# Patient Record
Sex: FEMALE | Race: WHITE | URBAN - NONMETROPOLITAN AREA
[De-identification: names, ages, dates, MRNs, and addresses within clinical notes are randomized per-mention and may not be internally consistent; named-entity substitution may affect disease eponyms.]

---

## 2019-01-05 ENCOUNTER — HOSPITAL ENCOUNTER (INPATIENT)
Age: 64
LOS: 32 days | Discharge: NURSING, REHAB OR LONG-TERM CARE | DRG: 885 | End: 2019-02-06
Attending: PSYCHIATRY & NEUROLOGY | Admitting: PSYCHIATRY & NEUROLOGY
Payer: MEDICARE

## 2019-01-05 PROBLEM — F31.13: Status: ACTIVE | Noted: 2019-01-05

## 2019-01-05 LAB — ALCOHOL SCREEN SERUM: <0.01 %WT/VOL

## 2019-01-05 PROCEDURE — 6370000000 HC RX 637 (ALT 250 FOR IP): Performed by: PSYCHIATRY & NEUROLOGY

## 2019-01-05 PROCEDURE — 6370000000 HC RX 637 (ALT 250 FOR IP): Performed by: HOSPITALIST

## 2019-01-05 PROCEDURE — 36415 COLL VENOUS BLD VENIPUNCTURE: CPT

## 2019-01-05 PROCEDURE — 1240000000 HC EMOTIONAL WELLNESS R&B

## 2019-01-05 PROCEDURE — G0480 DRUG TEST DEF 1-7 CLASSES: HCPCS

## 2019-01-05 RX ORDER — DIVALPROEX SODIUM 500 MG/1
1000 TABLET, EXTENDED RELEASE ORAL NIGHTLY
Status: DISCONTINUED | OUTPATIENT
Start: 2019-01-05 | End: 2019-01-07

## 2019-01-05 RX ORDER — TRAZODONE HYDROCHLORIDE 50 MG/1
50 TABLET ORAL NIGHTLY PRN
Status: DISCONTINUED | OUTPATIENT
Start: 2019-01-05 | End: 2019-01-07

## 2019-01-05 RX ORDER — DIVALPROEX SODIUM 500 MG/1
1000 TABLET, EXTENDED RELEASE ORAL DAILY
Status: DISCONTINUED | OUTPATIENT
Start: 2019-01-05 | End: 2019-01-05

## 2019-01-05 RX ORDER — PALIPERIDONE 3 MG/1
6 TABLET, EXTENDED RELEASE ORAL NIGHTLY
Status: DISCONTINUED | OUTPATIENT
Start: 2019-01-05 | End: 2019-01-08

## 2019-01-05 RX ORDER — MAGNESIUM HYDROXIDE/ALUMINUM HYDROXICE/SIMETHICONE 120; 1200; 1200 MG/30ML; MG/30ML; MG/30ML
30 SUSPENSION ORAL EVERY 6 HOURS PRN
Status: DISCONTINUED | OUTPATIENT
Start: 2019-01-05 | End: 2019-02-06 | Stop reason: HOSPADM

## 2019-01-05 RX ORDER — LANOLIN ALCOHOL/MO/W.PET/CERES
100 CREAM (GRAM) TOPICAL DAILY
COMMUNITY

## 2019-01-05 RX ORDER — CETIRIZINE HYDROCHLORIDE 10 MG/1
10 TABLET ORAL DAILY
Status: DISCONTINUED | OUTPATIENT
Start: 2019-01-05 | End: 2019-02-06 | Stop reason: HOSPADM

## 2019-01-05 RX ORDER — FAMOTIDINE 20 MG/1
20 TABLET, FILM COATED ORAL 2 TIMES DAILY
Status: DISCONTINUED | OUTPATIENT
Start: 2019-01-05 | End: 2019-02-06 | Stop reason: HOSPADM

## 2019-01-05 RX ORDER — LORAZEPAM 1 MG/1
1 TABLET ORAL EVERY 4 HOURS PRN
Status: DISCONTINUED | OUTPATIENT
Start: 2019-01-05 | End: 2019-02-06 | Stop reason: HOSPADM

## 2019-01-05 RX ORDER — ACETAMINOPHEN 325 MG/1
650 TABLET ORAL EVERY 4 HOURS PRN
Status: DISCONTINUED | OUTPATIENT
Start: 2019-01-05 | End: 2019-02-06 | Stop reason: HOSPADM

## 2019-01-05 RX ORDER — BENZONATATE 100 MG/1
100 CAPSULE ORAL 3 TIMES DAILY PRN
Status: DISCONTINUED | OUTPATIENT
Start: 2019-01-05 | End: 2019-02-06 | Stop reason: HOSPADM

## 2019-01-05 RX ORDER — RANITIDINE 150 MG/1
150 TABLET ORAL 2 TIMES DAILY
COMMUNITY

## 2019-01-05 RX ORDER — RISPERIDONE 2 MG/1
2 TABLET, FILM COATED ORAL NIGHTLY
Status: DISCONTINUED | OUTPATIENT
Start: 2019-01-05 | End: 2019-01-05

## 2019-01-05 RX ADMIN — ACETAMINOPHEN 650 MG: 325 TABLET ORAL at 19:38

## 2019-01-05 RX ADMIN — PALIPERIDONE 6 MG: 3 TABLET, EXTENDED RELEASE ORAL at 20:09

## 2019-01-05 RX ADMIN — FAMOTIDINE 20 MG: 20 TABLET ORAL at 20:09

## 2019-01-05 RX ADMIN — BENZONATATE 100 MG: 100 CAPSULE ORAL at 23:23

## 2019-01-05 RX ADMIN — ACETAMINOPHEN 650 MG: 325 TABLET ORAL at 12:11

## 2019-01-05 RX ADMIN — CETIRIZINE HYDROCHLORIDE 10 MG: 10 TABLET, FILM COATED ORAL at 12:11

## 2019-01-05 RX ADMIN — BENZOCAINE AND MENTHOL 1 LOZENGE: 15; 3.6 LOZENGE ORAL at 22:52

## 2019-01-05 RX ADMIN — DIVALPROEX SODIUM 1000 MG: 500 TABLET, EXTENDED RELEASE ORAL at 20:09

## 2019-01-05 RX ADMIN — BENZOCAINE AND MENTHOL 1 LOZENGE: 15; 3.6 LOZENGE ORAL at 20:11

## 2019-01-05 RX ADMIN — BENZOCAINE AND MENTHOL 1 LOZENGE: 15; 3.6 LOZENGE ORAL at 15:46

## 2019-01-05 RX ADMIN — BENZONATATE 100 MG: 100 CAPSULE ORAL at 12:11

## 2019-01-05 ASSESSMENT — SLEEP AND FATIGUE QUESTIONNAIRES
DIFFICULTY STAYING ASLEEP: YES
DIFFICULTY FALLING ASLEEP: YES
DO YOU USE A SLEEP AID: NO
AVERAGE NUMBER OF SLEEP HOURS: 1
SLEEP PATTERN: DIFFICULTY FALLING ASLEEP;INSOMNIA
DIFFICULTY ARISING: NO
DO YOU HAVE DIFFICULTY SLEEPING: YES
RESTFUL SLEEP: NO

## 2019-01-05 ASSESSMENT — PATIENT HEALTH QUESTIONNAIRE - PHQ9: SUM OF ALL RESPONSES TO PHQ QUESTIONS 1-9: 8

## 2019-01-05 ASSESSMENT — PAIN SCALES - GENERAL: PAINLEVEL_OUTOF10: 3

## 2019-01-06 LAB
ANION GAP SERPL CALCULATED.3IONS-SCNC: 11 MMOL/L (ref 4–16)
BASOPHILS ABSOLUTE: 0.1 K/CU MM
BASOPHILS RELATIVE PERCENT: 0.4 % (ref 0–1)
BUN BLDV-MCNC: 9 MG/DL (ref 6–23)
CALCIUM SERPL-MCNC: 8.8 MG/DL (ref 8.3–10.6)
CHLORIDE BLD-SCNC: 101 MMOL/L (ref 99–110)
CO2: 28 MMOL/L (ref 21–32)
CREAT SERPL-MCNC: 0.7 MG/DL (ref 0.6–1.1)
DIFFERENTIAL TYPE: ABNORMAL
EOSINOPHILS ABSOLUTE: 0.2 K/CU MM
EOSINOPHILS RELATIVE PERCENT: 1.5 % (ref 0–3)
ESTIMATED AVERAGE GLUCOSE: 157 MG/DL
GFR AFRICAN AMERICAN: >60 ML/MIN/1.73M2
GFR NON-AFRICAN AMERICAN: >60 ML/MIN/1.73M2
GLUCOSE BLD-MCNC: 161 MG/DL (ref 70–99)
HBA1C MFR BLD: 7.1 % (ref 4.2–6.3)
HCT VFR BLD CALC: 42.6 % (ref 37–47)
HEMOGLOBIN: 13.3 GM/DL (ref 12.5–16)
IMMATURE NEUTROPHIL %: 0.5 % (ref 0–0.43)
LYMPHOCYTES ABSOLUTE: 3.1 K/CU MM
LYMPHOCYTES RELATIVE PERCENT: 23.7 % (ref 24–44)
MCH RBC QN AUTO: 30.1 PG (ref 27–31)
MCHC RBC AUTO-ENTMCNC: 31.2 % (ref 32–36)
MCV RBC AUTO: 96.4 FL (ref 78–100)
MONOCYTES ABSOLUTE: 1 K/CU MM
MONOCYTES RELATIVE PERCENT: 7.5 % (ref 0–4)
PDW BLD-RTO: 12.5 % (ref 11.7–14.9)
PLATELET # BLD: 271 K/CU MM (ref 140–440)
PMV BLD AUTO: 11.7 FL (ref 7.5–11.1)
POTASSIUM SERPL-SCNC: 4.2 MMOL/L (ref 3.5–5.1)
RBC # BLD: 4.42 M/CU MM (ref 4.2–5.4)
SEGMENTED NEUTROPHILS ABSOLUTE COUNT: 8.7 K/CU MM
SEGMENTED NEUTROPHILS RELATIVE PERCENT: 66.4 % (ref 36–66)
SODIUM BLD-SCNC: 140 MMOL/L (ref 135–145)
TOTAL IMMATURE NEUTOROPHIL: 0.07 K/CU MM
WBC # BLD: 13.2 K/CU MM (ref 4–10.5)

## 2019-01-06 PROCEDURE — 80048 BASIC METABOLIC PNL TOTAL CA: CPT

## 2019-01-06 PROCEDURE — 1240000000 HC EMOTIONAL WELLNESS R&B

## 2019-01-06 PROCEDURE — 6360000002 HC RX W HCPCS: Performed by: PSYCHIATRY & NEUROLOGY

## 2019-01-06 PROCEDURE — 6370000000 HC RX 637 (ALT 250 FOR IP): Performed by: PSYCHIATRY & NEUROLOGY

## 2019-01-06 PROCEDURE — 83036 HEMOGLOBIN GLYCOSYLATED A1C: CPT

## 2019-01-06 PROCEDURE — 36415 COLL VENOUS BLD VENIPUNCTURE: CPT

## 2019-01-06 PROCEDURE — 6370000000 HC RX 637 (ALT 250 FOR IP): Performed by: HOSPITALIST

## 2019-01-06 PROCEDURE — 85025 COMPLETE CBC W/AUTO DIFF WBC: CPT

## 2019-01-06 RX ADMIN — DIVALPROEX SODIUM 1000 MG: 500 TABLET, EXTENDED RELEASE ORAL at 20:47

## 2019-01-06 RX ADMIN — CETIRIZINE HYDROCHLORIDE 10 MG: 10 TABLET, FILM COATED ORAL at 08:36

## 2019-01-06 RX ADMIN — FAMOTIDINE 20 MG: 20 TABLET ORAL at 20:47

## 2019-01-06 RX ADMIN — ACETAMINOPHEN 650 MG: 325 TABLET ORAL at 10:50

## 2019-01-06 RX ADMIN — ACETAMINOPHEN 650 MG: 325 TABLET ORAL at 03:15

## 2019-01-06 RX ADMIN — BENZOCAINE AND MENTHOL 1 LOZENGE: 15; 3.6 LOZENGE ORAL at 15:44

## 2019-01-06 RX ADMIN — ACETAMINOPHEN 650 MG: 325 TABLET ORAL at 15:44

## 2019-01-06 RX ADMIN — PALIPERIDONE 6 MG: 3 TABLET, EXTENDED RELEASE ORAL at 20:47

## 2019-01-06 RX ADMIN — FAMOTIDINE 20 MG: 20 TABLET ORAL at 08:36

## 2019-01-06 RX ADMIN — BENZONATATE 100 MG: 100 CAPSULE ORAL at 15:44

## 2019-01-06 RX ADMIN — PALIPERIDONE PALMITATE 234 MG: 234 INJECTION INTRAMUSCULAR at 08:35

## 2019-01-06 RX ADMIN — BENZONATATE 100 MG: 100 CAPSULE ORAL at 03:15

## 2019-01-06 RX ADMIN — BENZONATATE 100 MG: 100 CAPSULE ORAL at 08:36

## 2019-01-06 ASSESSMENT — PAIN SCALES - GENERAL
PAINLEVEL_OUTOF10: 7
PAINLEVEL_OUTOF10: 3
PAINLEVEL_OUTOF10: 0

## 2019-01-07 PROCEDURE — 1240000000 HC EMOTIONAL WELLNESS R&B

## 2019-01-07 PROCEDURE — 6370000000 HC RX 637 (ALT 250 FOR IP): Performed by: PSYCHIATRY & NEUROLOGY

## 2019-01-07 PROCEDURE — 6370000000 HC RX 637 (ALT 250 FOR IP): Performed by: HOSPITALIST

## 2019-01-07 PROCEDURE — 6370000000 HC RX 637 (ALT 250 FOR IP): Performed by: NURSE PRACTITIONER

## 2019-01-07 RX ORDER — DIVALPROEX SODIUM 500 MG/1
2000 TABLET, EXTENDED RELEASE ORAL NIGHTLY
Status: DISCONTINUED | OUTPATIENT
Start: 2019-01-07 | End: 2019-01-08

## 2019-01-07 RX ORDER — TRAZODONE HYDROCHLORIDE 100 MG/1
100 TABLET ORAL NIGHTLY PRN
Status: DISCONTINUED | OUTPATIENT
Start: 2019-01-07 | End: 2019-01-08

## 2019-01-07 RX ADMIN — CETIRIZINE HYDROCHLORIDE 10 MG: 10 TABLET, FILM COATED ORAL at 08:25

## 2019-01-07 RX ADMIN — DIVALPROEX SODIUM 2000 MG: 500 TABLET, EXTENDED RELEASE ORAL at 20:46

## 2019-01-07 RX ADMIN — PALIPERIDONE 6 MG: 3 TABLET, EXTENDED RELEASE ORAL at 20:46

## 2019-01-07 RX ADMIN — ACETAMINOPHEN 650 MG: 325 TABLET ORAL at 11:03

## 2019-01-07 RX ADMIN — ALUMINUM HYDROXIDE, MAGNESIUM HYDROXIDE, AND SIMETHICONE 30 ML: 200; 200; 20 SUSPENSION ORAL at 18:30

## 2019-01-07 RX ADMIN — BENZOCAINE AND MENTHOL 1 LOZENGE: 15; 3.6 LOZENGE ORAL at 00:11

## 2019-01-07 RX ADMIN — FAMOTIDINE 20 MG: 20 TABLET ORAL at 08:25

## 2019-01-07 RX ADMIN — ACETAMINOPHEN 650 MG: 325 TABLET ORAL at 00:11

## 2019-01-07 RX ADMIN — FAMOTIDINE 20 MG: 20 TABLET ORAL at 20:46

## 2019-01-07 RX ADMIN — BENZOCAINE AND MENTHOL 1 LOZENGE: 15; 3.6 LOZENGE ORAL at 04:39

## 2019-01-07 ASSESSMENT — PAIN SCALES - GENERAL
PAINLEVEL_OUTOF10: 3
PAINLEVEL_OUTOF10: 3
PAINLEVEL_OUTOF10: 8
PAINLEVEL_OUTOF10: 3
PAINLEVEL_OUTOF10: 0

## 2019-01-08 LAB — VALPROIC ACID LEVEL: 66.2 UG/ML (ref 50–100)

## 2019-01-08 PROCEDURE — 1240000000 HC EMOTIONAL WELLNESS R&B

## 2019-01-08 PROCEDURE — 36415 COLL VENOUS BLD VENIPUNCTURE: CPT

## 2019-01-08 PROCEDURE — 80164 ASSAY DIPROPYLACETIC ACD TOT: CPT

## 2019-01-08 PROCEDURE — 6370000000 HC RX 637 (ALT 250 FOR IP): Performed by: HOSPITALIST

## 2019-01-08 PROCEDURE — 6370000000 HC RX 637 (ALT 250 FOR IP): Performed by: PSYCHIATRY & NEUROLOGY

## 2019-01-08 PROCEDURE — 6370000000 HC RX 637 (ALT 250 FOR IP): Performed by: NURSE PRACTITIONER

## 2019-01-08 RX ORDER — LORAZEPAM 2 MG/ML
2 INJECTION INTRAMUSCULAR EVERY 6 HOURS PRN
Status: DISCONTINUED | OUTPATIENT
Start: 2019-01-08 | End: 2019-01-31

## 2019-01-08 RX ORDER — ZIPRASIDONE MESYLATE 20 MG/ML
20 INJECTION, POWDER, LYOPHILIZED, FOR SOLUTION INTRAMUSCULAR EVERY 12 HOURS PRN
Status: DISCONTINUED | OUTPATIENT
Start: 2019-01-08 | End: 2019-01-11

## 2019-01-08 RX ORDER — PALIPERIDONE 3 MG/1
9 TABLET, EXTENDED RELEASE ORAL NIGHTLY
Status: DISCONTINUED | OUTPATIENT
Start: 2019-01-08 | End: 2019-01-20

## 2019-01-08 RX ORDER — DIVALPROEX SODIUM 500 MG/1
1500 TABLET, EXTENDED RELEASE ORAL 2 TIMES DAILY
Status: DISCONTINUED | OUTPATIENT
Start: 2019-01-08 | End: 2019-01-17

## 2019-01-08 RX ADMIN — FAMOTIDINE 20 MG: 20 TABLET ORAL at 08:07

## 2019-01-08 RX ADMIN — ACETAMINOPHEN 650 MG: 325 TABLET ORAL at 12:37

## 2019-01-08 RX ADMIN — FAMOTIDINE 20 MG: 20 TABLET ORAL at 20:35

## 2019-01-08 RX ADMIN — DIVALPROEX SODIUM 1500 MG: 500 TABLET, EXTENDED RELEASE ORAL at 20:35

## 2019-01-08 RX ADMIN — TRAZODONE HYDROCHLORIDE 150 MG: 100 TABLET ORAL at 20:35

## 2019-01-08 RX ADMIN — PALIPERIDONE 9 MG: 3 TABLET, EXTENDED RELEASE ORAL at 20:35

## 2019-01-08 RX ADMIN — CETIRIZINE HYDROCHLORIDE 10 MG: 10 TABLET, FILM COATED ORAL at 08:07

## 2019-01-08 ASSESSMENT — PAIN SCALES - GENERAL: PAINLEVEL_OUTOF10: 8

## 2019-01-09 PROBLEM — E11.9 TYPE 2 DIABETES MELLITUS WITHOUT COMPLICATION, WITHOUT LONG-TERM CURRENT USE OF INSULIN (HCC): Status: ACTIVE | Noted: 2019-01-09

## 2019-01-09 PROCEDURE — 6360000002 HC RX W HCPCS: Performed by: PSYCHIATRY & NEUROLOGY

## 2019-01-09 PROCEDURE — 6370000000 HC RX 637 (ALT 250 FOR IP): Performed by: NURSE PRACTITIONER

## 2019-01-09 PROCEDURE — 6370000000 HC RX 637 (ALT 250 FOR IP): Performed by: HOSPITALIST

## 2019-01-09 PROCEDURE — 6370000000 HC RX 637 (ALT 250 FOR IP): Performed by: PSYCHIATRY & NEUROLOGY

## 2019-01-09 PROCEDURE — 1240000000 HC EMOTIONAL WELLNESS R&B

## 2019-01-09 RX ORDER — NICOTINE POLACRILEX 4 MG
15 LOZENGE BUCCAL PRN
Status: DISCONTINUED | OUTPATIENT
Start: 2019-01-09 | End: 2019-02-06 | Stop reason: HOSPADM

## 2019-01-09 RX ORDER — DEXTROSE MONOHYDRATE 25 G/50ML
12.5 INJECTION, SOLUTION INTRAVENOUS PRN
Status: DISCONTINUED | OUTPATIENT
Start: 2019-01-09 | End: 2019-02-06 | Stop reason: HOSPADM

## 2019-01-09 RX ORDER — DEXTROSE MONOHYDRATE 50 MG/ML
100 INJECTION, SOLUTION INTRAVENOUS PRN
Status: DISCONTINUED | OUTPATIENT
Start: 2019-01-09 | End: 2019-02-06 | Stop reason: HOSPADM

## 2019-01-09 RX ADMIN — FAMOTIDINE 20 MG: 20 TABLET ORAL at 10:25

## 2019-01-09 RX ADMIN — BENZONATATE 100 MG: 100 CAPSULE ORAL at 10:25

## 2019-01-09 RX ADMIN — CETIRIZINE HYDROCHLORIDE 10 MG: 10 TABLET, FILM COATED ORAL at 10:25

## 2019-01-09 RX ADMIN — DIVALPROEX SODIUM 1500 MG: 500 TABLET, EXTENDED RELEASE ORAL at 20:24

## 2019-01-09 RX ADMIN — LORAZEPAM 1 MG: 1 TABLET ORAL at 20:24

## 2019-01-09 RX ADMIN — BENZOCAINE AND MENTHOL 1 LOZENGE: 15; 3.6 LOZENGE ORAL at 02:52

## 2019-01-09 RX ADMIN — LORAZEPAM 1 MG: 1 TABLET ORAL at 10:27

## 2019-01-09 RX ADMIN — FAMOTIDINE 20 MG: 20 TABLET ORAL at 20:24

## 2019-01-09 RX ADMIN — PALIPERIDONE 9 MG: 3 TABLET, EXTENDED RELEASE ORAL at 20:24

## 2019-01-09 RX ADMIN — TRAZODONE HYDROCHLORIDE 150 MG: 100 TABLET ORAL at 20:24

## 2019-01-09 ASSESSMENT — PAIN SCALES - GENERAL: PAINLEVEL_OUTOF10: 0

## 2019-01-10 LAB — GLUCOSE BLD-MCNC: 140 MG/DL (ref 70–99)

## 2019-01-10 PROCEDURE — 6360000002 HC RX W HCPCS: Performed by: PSYCHIATRY & NEUROLOGY

## 2019-01-10 PROCEDURE — 82962 GLUCOSE BLOOD TEST: CPT

## 2019-01-10 PROCEDURE — 1240000000 HC EMOTIONAL WELLNESS R&B

## 2019-01-10 PROCEDURE — 6370000000 HC RX 637 (ALT 250 FOR IP): Performed by: HOSPITALIST

## 2019-01-10 PROCEDURE — 6370000000 HC RX 637 (ALT 250 FOR IP): Performed by: PSYCHIATRY & NEUROLOGY

## 2019-01-10 PROCEDURE — 6370000000 HC RX 637 (ALT 250 FOR IP): Performed by: NURSE PRACTITIONER

## 2019-01-10 RX ORDER — FLUTICASONE PROPIONATE 50 MCG
1 SPRAY, SUSPENSION (ML) NASAL DAILY
Status: DISCONTINUED | OUTPATIENT
Start: 2019-01-10 | End: 2019-02-06 | Stop reason: HOSPADM

## 2019-01-10 RX ADMIN — TRAZODONE HYDROCHLORIDE 150 MG: 100 TABLET ORAL at 21:01

## 2019-01-10 RX ADMIN — FAMOTIDINE 20 MG: 20 TABLET ORAL at 21:01

## 2019-01-10 RX ADMIN — PALIPERIDONE PALMITATE 156 MG: 156 INJECTION INTRAMUSCULAR at 18:02

## 2019-01-10 RX ADMIN — ACETAMINOPHEN 650 MG: 325 TABLET ORAL at 17:38

## 2019-01-10 ASSESSMENT — PAIN SCALES - GENERAL
PAINLEVEL_OUTOF10: 0
PAINLEVEL_OUTOF10: 3

## 2019-01-11 LAB
BACTERIA: ABNORMAL /HPF
BILIRUBIN URINE: NEGATIVE MG/DL
BLOOD, URINE: NEGATIVE
CAST TYPE: ABNORMAL /HPF
CLARITY: CLEAR
COLOR: YELLOW
CRYSTAL TYPE: ABNORMAL /HPF
EPITHELIAL CELLS, UA: ABNORMAL /HPF
GLUCOSE, URINE: NEGATIVE MG/DL
KETONES, URINE: ABNORMAL MG/DL
LEUKOCYTE ESTERASE, URINE: NEGATIVE
MUCUS: ABNORMAL HPF
NITRITE URINE, QUANTITATIVE: NEGATIVE
PH, URINE: 6 (ref 5–8)
PROTEIN UA: 30 MG/DL
RBC URINE: ABNORMAL /HPF (ref 0–6)
SPECIFIC GRAVITY UA: 1.03 (ref 1–1.03)
UROBILINOGEN, URINE: 0.2 MG/DL (ref 0.2–1)
VOLUME, (UVOL): 12 ML (ref 10–12)
WBC UA: ABNORMAL /HPF (ref 0–5)

## 2019-01-11 PROCEDURE — 87086 URINE CULTURE/COLONY COUNT: CPT

## 2019-01-11 PROCEDURE — 81001 URINALYSIS AUTO W/SCOPE: CPT

## 2019-01-11 PROCEDURE — 6370000000 HC RX 637 (ALT 250 FOR IP): Performed by: HOSPITALIST

## 2019-01-11 PROCEDURE — 1240000000 HC EMOTIONAL WELLNESS R&B

## 2019-01-11 PROCEDURE — 6370000000 HC RX 637 (ALT 250 FOR IP): Performed by: NURSE PRACTITIONER

## 2019-01-11 PROCEDURE — 6360000002 HC RX W HCPCS: Performed by: PSYCHIATRY & NEUROLOGY

## 2019-01-11 RX ORDER — ZIPRASIDONE MESYLATE 20 MG/ML
5 INJECTION, POWDER, LYOPHILIZED, FOR SOLUTION INTRAMUSCULAR EVERY 12 HOURS PRN
Status: DISCONTINUED | OUTPATIENT
Start: 2019-01-11 | End: 2019-01-17

## 2019-01-11 RX ADMIN — LORAZEPAM 2 MG: 2 INJECTION INTRAMUSCULAR; INTRAVENOUS at 11:25

## 2019-01-12 PROCEDURE — 6360000002 HC RX W HCPCS: Performed by: PSYCHIATRY & NEUROLOGY

## 2019-01-12 PROCEDURE — 1240000000 HC EMOTIONAL WELLNESS R&B

## 2019-01-12 RX ORDER — ZIPRASIDONE MESYLATE 20 MG/ML
5 INJECTION, POWDER, LYOPHILIZED, FOR SOLUTION INTRAMUSCULAR ONCE
Status: COMPLETED | OUTPATIENT
Start: 2019-01-13 | End: 2019-01-12

## 2019-01-12 RX ADMIN — ZIPRASIDONE MESYLATE 5 MG: 20 INJECTION, POWDER, LYOPHILIZED, FOR SOLUTION INTRAMUSCULAR at 14:34

## 2019-01-12 RX ADMIN — ZIPRASIDONE MESYLATE 5 MG: 20 INJECTION, POWDER, LYOPHILIZED, FOR SOLUTION INTRAMUSCULAR at 23:54

## 2019-01-12 RX ADMIN — LORAZEPAM 2 MG: 2 INJECTION INTRAMUSCULAR; INTRAVENOUS at 22:19

## 2019-01-12 RX ADMIN — LORAZEPAM 2 MG: 2 INJECTION INTRAMUSCULAR; INTRAVENOUS at 14:33

## 2019-01-12 ASSESSMENT — PAIN SCALES - GENERAL: PAINLEVEL_OUTOF10: 0

## 2019-01-13 ENCOUNTER — APPOINTMENT (OUTPATIENT)
Dept: CT IMAGING | Age: 64
DRG: 885 | End: 2019-01-13
Attending: PSYCHIATRY & NEUROLOGY
Payer: MEDICARE

## 2019-01-13 LAB
CULTURE: NORMAL
Lab: NORMAL
REPORT STATUS: NORMAL
SPECIMEN: NORMAL

## 2019-01-13 PROCEDURE — 6370000000 HC RX 637 (ALT 250 FOR IP): Performed by: HOSPITALIST

## 2019-01-13 PROCEDURE — 1240000000 HC EMOTIONAL WELLNESS R&B

## 2019-01-13 PROCEDURE — 70450 CT HEAD/BRAIN W/O DYE: CPT

## 2019-01-13 PROCEDURE — 6360000002 HC RX W HCPCS: Performed by: PSYCHIATRY & NEUROLOGY

## 2019-01-13 PROCEDURE — 2580000003 HC RX 258: Performed by: PSYCHIATRY & NEUROLOGY

## 2019-01-13 RX ORDER — ZIPRASIDONE MESYLATE 20 MG/ML
10 INJECTION, POWDER, LYOPHILIZED, FOR SOLUTION INTRAMUSCULAR ONCE
Status: COMPLETED | OUTPATIENT
Start: 2019-01-13 | End: 2019-01-13

## 2019-01-13 RX ORDER — DIPHENHYDRAMINE HYDROCHLORIDE 50 MG/ML
50 INJECTION INTRAMUSCULAR; INTRAVENOUS EVERY 6 HOURS PRN
Status: DISCONTINUED | OUTPATIENT
Start: 2019-01-13 | End: 2019-01-31

## 2019-01-13 RX ADMIN — FAMOTIDINE 20 MG: 20 TABLET ORAL at 23:25

## 2019-01-13 RX ADMIN — LORAZEPAM 2 MG: 2 INJECTION INTRAMUSCULAR; INTRAVENOUS at 15:40

## 2019-01-13 RX ADMIN — ZIPRASIDONE MESYLATE 10 MG: 20 INJECTION, POWDER, LYOPHILIZED, FOR SOLUTION INTRAMUSCULAR at 15:41

## 2019-01-13 RX ADMIN — WATER 1.2 ML: 1 INJECTION INTRAMUSCULAR; INTRAVENOUS; SUBCUTANEOUS at 15:47

## 2019-01-13 RX ADMIN — DIPHENHYDRAMINE HYDROCHLORIDE 50 MG: 50 INJECTION, SOLUTION INTRAMUSCULAR; INTRAVENOUS at 15:39

## 2019-01-13 RX ADMIN — WATER 1.2 ML: 1 INJECTION INTRAMUSCULAR; INTRAVENOUS; SUBCUTANEOUS at 00:04

## 2019-01-13 RX ADMIN — LORAZEPAM 2 MG: 2 INJECTION INTRAMUSCULAR; INTRAVENOUS at 08:27

## 2019-01-13 RX ADMIN — ZIPRASIDONE MESYLATE 5 MG: 20 INJECTION, POWDER, LYOPHILIZED, FOR SOLUTION INTRAMUSCULAR at 11:58

## 2019-01-13 ASSESSMENT — PAIN SCALES - GENERAL: PAINLEVEL_OUTOF10: 0

## 2019-01-14 LAB — GLUCOSE BLD-MCNC: 204 MG/DL (ref 70–99)

## 2019-01-14 PROCEDURE — 82962 GLUCOSE BLOOD TEST: CPT

## 2019-01-14 PROCEDURE — 6370000000 HC RX 637 (ALT 250 FOR IP): Performed by: PSYCHIATRY & NEUROLOGY

## 2019-01-14 PROCEDURE — 6360000002 HC RX W HCPCS: Performed by: PSYCHIATRY & NEUROLOGY

## 2019-01-14 PROCEDURE — 6370000000 HC RX 637 (ALT 250 FOR IP): Performed by: HOSPITALIST

## 2019-01-14 PROCEDURE — 1240000000 HC EMOTIONAL WELLNESS R&B

## 2019-01-14 RX ADMIN — BENZONATATE 100 MG: 100 CAPSULE ORAL at 09:06

## 2019-01-14 RX ADMIN — LORAZEPAM 2 MG: 2 INJECTION INTRAMUSCULAR; INTRAVENOUS at 00:05

## 2019-01-14 RX ADMIN — ZIPRASIDONE MESYLATE 5 MG: 20 INJECTION, POWDER, LYOPHILIZED, FOR SOLUTION INTRAMUSCULAR at 00:05

## 2019-01-14 RX ADMIN — ACETAMINOPHEN 650 MG: 325 TABLET ORAL at 10:23

## 2019-01-14 RX ADMIN — CETIRIZINE HYDROCHLORIDE 10 MG: 10 TABLET, FILM COATED ORAL at 08:49

## 2019-01-14 ASSESSMENT — PAIN SCALES - GENERAL
PAINLEVEL_OUTOF10: 0
PAINLEVEL_OUTOF10: 3

## 2019-01-15 LAB — GLUCOSE BLD-MCNC: 144 MG/DL (ref 70–99)

## 2019-01-15 PROCEDURE — 82962 GLUCOSE BLOOD TEST: CPT

## 2019-01-15 PROCEDURE — 1240000000 HC EMOTIONAL WELLNESS R&B

## 2019-01-15 PROCEDURE — 6370000000 HC RX 637 (ALT 250 FOR IP): Performed by: PSYCHIATRY & NEUROLOGY

## 2019-01-15 PROCEDURE — 6370000000 HC RX 637 (ALT 250 FOR IP): Performed by: HOSPITALIST

## 2019-01-15 PROCEDURE — 6370000000 HC RX 637 (ALT 250 FOR IP): Performed by: NURSE PRACTITIONER

## 2019-01-15 RX ORDER — HYDROXYZINE PAMOATE 50 MG/1
50 CAPSULE ORAL EVERY 4 HOURS PRN
Status: DISCONTINUED | OUTPATIENT
Start: 2019-01-15 | End: 2019-02-06 | Stop reason: HOSPADM

## 2019-01-15 RX ORDER — HYDROXYZINE HYDROCHLORIDE 10 MG/1
50 TABLET, FILM COATED ORAL EVERY 4 HOURS PRN
Status: DISCONTINUED | OUTPATIENT
Start: 2019-01-15 | End: 2019-01-15

## 2019-01-15 RX ADMIN — TRAZODONE HYDROCHLORIDE 150 MG: 100 TABLET ORAL at 21:19

## 2019-01-15 RX ADMIN — DIVALPROEX SODIUM 750 MG: 500 TABLET, EXTENDED RELEASE ORAL at 14:29

## 2019-01-15 RX ADMIN — LORAZEPAM 1 MG: 1 TABLET ORAL at 01:54

## 2019-01-15 RX ADMIN — PALIPERIDONE 9 MG: 3 TABLET, EXTENDED RELEASE ORAL at 21:19

## 2019-01-15 RX ADMIN — DIVALPROEX SODIUM 1500 MG: 500 TABLET, EXTENDED RELEASE ORAL at 21:19

## 2019-01-15 RX ADMIN — ACETAMINOPHEN 650 MG: 325 TABLET ORAL at 14:29

## 2019-01-15 RX ADMIN — LORAZEPAM 1 MG: 1 TABLET ORAL at 09:09

## 2019-01-15 RX ADMIN — FAMOTIDINE 20 MG: 20 TABLET ORAL at 21:19

## 2019-01-15 RX ADMIN — HYDROXYZINE PAMOATE 50 MG: 50 CAPSULE ORAL at 14:29

## 2019-01-15 RX ADMIN — ACETAMINOPHEN 650 MG: 325 TABLET ORAL at 21:19

## 2019-01-15 RX ADMIN — ALUMINUM HYDROXIDE, MAGNESIUM HYDROXIDE, AND SIMETHICONE 30 ML: 200; 200; 20 SUSPENSION ORAL at 09:09

## 2019-01-15 ASSESSMENT — PAIN SCALES - GENERAL
PAINLEVEL_OUTOF10: 0
PAINLEVEL_OUTOF10: 3

## 2019-01-16 PROCEDURE — 6370000000 HC RX 637 (ALT 250 FOR IP): Performed by: PSYCHIATRY & NEUROLOGY

## 2019-01-16 PROCEDURE — 6370000000 HC RX 637 (ALT 250 FOR IP): Performed by: NURSE PRACTITIONER

## 2019-01-16 PROCEDURE — 1240000000 HC EMOTIONAL WELLNESS R&B

## 2019-01-16 PROCEDURE — 6370000000 HC RX 637 (ALT 250 FOR IP): Performed by: HOSPITALIST

## 2019-01-16 RX ORDER — CLOMIPRAMINE HYDROCHLORIDE 25 MG/1
25 CAPSULE ORAL NIGHTLY
Status: DISCONTINUED | OUTPATIENT
Start: 2019-01-16 | End: 2019-01-22

## 2019-01-16 RX ORDER — LOPERAMIDE HYDROCHLORIDE 2 MG/1
2 CAPSULE ORAL 4 TIMES DAILY PRN
Status: DISCONTINUED | OUTPATIENT
Start: 2019-01-16 | End: 2019-02-06 | Stop reason: HOSPADM

## 2019-01-16 RX ADMIN — DIVALPROEX SODIUM 1500 MG: 500 TABLET, EXTENDED RELEASE ORAL at 21:14

## 2019-01-16 RX ADMIN — FAMOTIDINE 20 MG: 20 TABLET ORAL at 21:14

## 2019-01-16 RX ADMIN — FAMOTIDINE 20 MG: 20 TABLET ORAL at 09:10

## 2019-01-16 RX ADMIN — METFORMIN HYDROCHLORIDE 500 MG: 500 TABLET ORAL at 09:10

## 2019-01-16 RX ADMIN — CETIRIZINE HYDROCHLORIDE 10 MG: 10 TABLET, FILM COATED ORAL at 09:10

## 2019-01-16 RX ADMIN — FLUTICASONE PROPIONATE 1 SPRAY: 50 SPRAY, METERED NASAL at 09:05

## 2019-01-16 RX ADMIN — DIVALPROEX SODIUM 1500 MG: 500 TABLET, EXTENDED RELEASE ORAL at 09:10

## 2019-01-16 RX ADMIN — PALIPERIDONE 9 MG: 3 TABLET, EXTENDED RELEASE ORAL at 21:14

## 2019-01-16 RX ADMIN — ACETAMINOPHEN 650 MG: 325 TABLET ORAL at 06:31

## 2019-01-16 RX ADMIN — TRAZODONE HYDROCHLORIDE 150 MG: 100 TABLET ORAL at 21:14

## 2019-01-16 RX ADMIN — CLOMIPRAMINE HYDROCHLORIDE 25 MG: 25 CAPSULE ORAL at 21:14

## 2019-01-16 RX ADMIN — LOPERAMIDE HYDROCHLORIDE 2 MG: 2 CAPSULE ORAL at 21:14

## 2019-01-16 RX ADMIN — LORAZEPAM 1 MG: 1 TABLET ORAL at 16:35

## 2019-01-16 ASSESSMENT — PAIN SCALES - GENERAL: PAINLEVEL_OUTOF10: 3

## 2019-01-17 LAB — VALPROIC ACID LEVEL: 104.5 UG/ML (ref 50–100)

## 2019-01-17 PROCEDURE — 6370000000 HC RX 637 (ALT 250 FOR IP): Performed by: PSYCHIATRY & NEUROLOGY

## 2019-01-17 PROCEDURE — 6360000002 HC RX W HCPCS: Performed by: PSYCHIATRY & NEUROLOGY

## 2019-01-17 PROCEDURE — 80164 ASSAY DIPROPYLACETIC ACD TOT: CPT

## 2019-01-17 PROCEDURE — 6370000000 HC RX 637 (ALT 250 FOR IP): Performed by: NURSE PRACTITIONER

## 2019-01-17 PROCEDURE — 6370000000 HC RX 637 (ALT 250 FOR IP): Performed by: HOSPITALIST

## 2019-01-17 PROCEDURE — 36415 COLL VENOUS BLD VENIPUNCTURE: CPT

## 2019-01-17 PROCEDURE — 1240000000 HC EMOTIONAL WELLNESS R&B

## 2019-01-17 RX ORDER — FLUPHENAZINE HYDROCHLORIDE 2.5 MG/ML
2.5 INJECTION, SOLUTION INTRAMUSCULAR EVERY 6 HOURS PRN
Status: DISCONTINUED | OUTPATIENT
Start: 2019-01-17 | End: 2019-01-17

## 2019-01-17 RX ORDER — FLUPHENAZINE HYDROCHLORIDE 2.5 MG/ML
5 INJECTION, SOLUTION INTRAMUSCULAR EVERY 6 HOURS PRN
Status: DISCONTINUED | OUTPATIENT
Start: 2019-01-17 | End: 2019-01-31

## 2019-01-17 RX ADMIN — CLOMIPRAMINE HYDROCHLORIDE 25 MG: 25 CAPSULE ORAL at 20:39

## 2019-01-17 RX ADMIN — CETIRIZINE HYDROCHLORIDE 10 MG: 10 TABLET, FILM COATED ORAL at 11:03

## 2019-01-17 RX ADMIN — METFORMIN HYDROCHLORIDE 500 MG: 500 TABLET ORAL at 11:02

## 2019-01-17 RX ADMIN — FLUPHENAZINE HYDROCHLORIDE 2.5 MG: 2.5 INJECTION, SOLUTION INTRAMUSCULAR at 15:45

## 2019-01-17 RX ADMIN — LORAZEPAM 2 MG: 2 INJECTION INTRAMUSCULAR; INTRAVENOUS at 22:56

## 2019-01-17 RX ADMIN — TRAZODONE HYDROCHLORIDE 150 MG: 100 TABLET ORAL at 20:40

## 2019-01-17 RX ADMIN — LORAZEPAM 1 MG: 1 TABLET ORAL at 13:42

## 2019-01-17 RX ADMIN — FAMOTIDINE 20 MG: 20 TABLET ORAL at 11:03

## 2019-01-17 RX ADMIN — DIVALPROEX SODIUM 1250 MG: 500 TABLET, EXTENDED RELEASE ORAL at 20:40

## 2019-01-17 RX ADMIN — FLUPHENAZINE HYDROCHLORIDE 5 MG: 2.5 INJECTION, SOLUTION INTRAMUSCULAR at 18:33

## 2019-01-17 RX ADMIN — DIVALPROEX SODIUM 1500 MG: 500 TABLET, EXTENDED RELEASE ORAL at 10:00

## 2019-01-17 RX ADMIN — FAMOTIDINE 20 MG: 20 TABLET ORAL at 20:39

## 2019-01-17 RX ADMIN — PALIPERIDONE 9 MG: 3 TABLET, EXTENDED RELEASE ORAL at 20:39

## 2019-01-18 LAB
ALBUMIN SERPL-MCNC: 3.6 GM/DL (ref 3.4–5)
ALP BLD-CCNC: 72 IU/L (ref 40–129)
ALT SERPL-CCNC: 24 U/L (ref 10–40)
AMMONIA: 38 UMOL/L (ref 11–51)
ANION GAP SERPL CALCULATED.3IONS-SCNC: 4 MMOL/L (ref 4–16)
AST SERPL-CCNC: 19 IU/L (ref 15–37)
BILIRUB SERPL-MCNC: 0.3 MG/DL (ref 0–1)
BUN BLDV-MCNC: 10 MG/DL (ref 6–23)
CALCIUM SERPL-MCNC: 9.9 MG/DL (ref 8.3–10.6)
CHLORIDE BLD-SCNC: 101 MMOL/L (ref 99–110)
CO2: 37 MMOL/L (ref 21–32)
CREAT SERPL-MCNC: 0.7 MG/DL (ref 0.6–1.1)
GFR AFRICAN AMERICAN: >60 ML/MIN/1.73M2
GFR NON-AFRICAN AMERICAN: >60 ML/MIN/1.73M2
GLUCOSE BLD-MCNC: 119 MG/DL (ref 70–99)
GLUCOSE BLD-MCNC: 124 MG/DL (ref 70–99)
POTASSIUM SERPL-SCNC: 4.5 MMOL/L (ref 3.5–5.1)
SODIUM BLD-SCNC: 142 MMOL/L (ref 135–145)
TOTAL PROTEIN: 7.3 GM/DL (ref 6.4–8.2)
VALPROIC ACID LEVEL: 126.6 UG/ML (ref 50–100)

## 2019-01-18 PROCEDURE — 6370000000 HC RX 637 (ALT 250 FOR IP): Performed by: NURSE PRACTITIONER

## 2019-01-18 PROCEDURE — 6370000000 HC RX 637 (ALT 250 FOR IP): Performed by: HOSPITALIST

## 2019-01-18 PROCEDURE — 80053 COMPREHEN METABOLIC PANEL: CPT

## 2019-01-18 PROCEDURE — 36415 COLL VENOUS BLD VENIPUNCTURE: CPT

## 2019-01-18 PROCEDURE — 82140 ASSAY OF AMMONIA: CPT

## 2019-01-18 PROCEDURE — 80164 ASSAY DIPROPYLACETIC ACD TOT: CPT

## 2019-01-18 PROCEDURE — 1240000000 HC EMOTIONAL WELLNESS R&B

## 2019-01-18 PROCEDURE — 82962 GLUCOSE BLOOD TEST: CPT

## 2019-01-18 RX ORDER — DIVALPROEX SODIUM 500 MG/1
1000 TABLET, EXTENDED RELEASE ORAL 2 TIMES DAILY
Status: DISCONTINUED | OUTPATIENT
Start: 2019-01-18 | End: 2019-01-19

## 2019-01-18 RX ADMIN — METFORMIN HYDROCHLORIDE 500 MG: 500 TABLET ORAL at 09:47

## 2019-01-18 RX ADMIN — CETIRIZINE HYDROCHLORIDE 10 MG: 10 TABLET, FILM COATED ORAL at 09:47

## 2019-01-18 RX ADMIN — DIVALPROEX SODIUM 1250 MG: 500 TABLET, EXTENDED RELEASE ORAL at 10:27

## 2019-01-18 RX ADMIN — FAMOTIDINE 20 MG: 20 TABLET ORAL at 09:47

## 2019-01-19 LAB
ALBUMIN SERPL-MCNC: 3.1 GM/DL (ref 3.4–5)
ALP BLD-CCNC: 65 IU/L (ref 40–129)
ALT SERPL-CCNC: 18 U/L (ref 10–40)
ANION GAP SERPL CALCULATED.3IONS-SCNC: 9 MMOL/L (ref 4–16)
AST SERPL-CCNC: 15 IU/L (ref 15–37)
BILIRUB SERPL-MCNC: 0.4 MG/DL (ref 0–1)
BUN BLDV-MCNC: 13 MG/DL (ref 6–23)
CALCIUM SERPL-MCNC: 9.8 MG/DL (ref 8.3–10.6)
CHLORIDE BLD-SCNC: 101 MMOL/L (ref 99–110)
CO2: 31 MMOL/L (ref 21–32)
CREAT SERPL-MCNC: 0.8 MG/DL (ref 0.6–1.1)
GFR AFRICAN AMERICAN: >60 ML/MIN/1.73M2
GFR NON-AFRICAN AMERICAN: >60 ML/MIN/1.73M2
GLUCOSE BLD-MCNC: 110 MG/DL (ref 70–99)
GLUCOSE BLD-MCNC: 146 MG/DL (ref 70–99)
GLUCOSE BLD-MCNC: 161 MG/DL (ref 70–99)
HCT VFR BLD CALC: 43.4 % (ref 37–47)
HEMOGLOBIN: 13.7 GM/DL (ref 12.5–16)
MCH RBC QN AUTO: 30.2 PG (ref 27–31)
MCHC RBC AUTO-ENTMCNC: 31.6 % (ref 32–36)
MCV RBC AUTO: 95.6 FL (ref 78–100)
PDW BLD-RTO: 12.4 % (ref 11.7–14.9)
PLATELET # BLD: 230 K/CU MM (ref 140–440)
PMV BLD AUTO: 12 FL (ref 7.5–11.1)
POTASSIUM SERPL-SCNC: 4.2 MMOL/L (ref 3.5–5.1)
RBC # BLD: 4.54 M/CU MM (ref 4.2–5.4)
SODIUM BLD-SCNC: 141 MMOL/L (ref 135–145)
TOTAL PROTEIN: 7.1 GM/DL (ref 6.4–8.2)
VALPROIC ACID LEVEL: 84.8 UG/ML (ref 50–100)
WBC # BLD: 12.8 K/CU MM (ref 4–10.5)

## 2019-01-19 PROCEDURE — 6360000002 HC RX W HCPCS: Performed by: PSYCHIATRY & NEUROLOGY

## 2019-01-19 PROCEDURE — 82962 GLUCOSE BLOOD TEST: CPT

## 2019-01-19 PROCEDURE — 6370000000 HC RX 637 (ALT 250 FOR IP): Performed by: HOSPITALIST

## 2019-01-19 PROCEDURE — 36415 COLL VENOUS BLD VENIPUNCTURE: CPT

## 2019-01-19 PROCEDURE — 6370000000 HC RX 637 (ALT 250 FOR IP): Performed by: PSYCHIATRY & NEUROLOGY

## 2019-01-19 PROCEDURE — 1240000000 HC EMOTIONAL WELLNESS R&B

## 2019-01-19 PROCEDURE — 6370000000 HC RX 637 (ALT 250 FOR IP): Performed by: NURSE PRACTITIONER

## 2019-01-19 PROCEDURE — 85027 COMPLETE CBC AUTOMATED: CPT

## 2019-01-19 PROCEDURE — 80053 COMPREHEN METABOLIC PANEL: CPT

## 2019-01-19 PROCEDURE — 80164 ASSAY DIPROPYLACETIC ACD TOT: CPT

## 2019-01-19 RX ORDER — DONEPEZIL HYDROCHLORIDE 5 MG/1
5 TABLET, FILM COATED ORAL NIGHTLY
Status: DISCONTINUED | OUTPATIENT
Start: 2019-01-19 | End: 2019-01-22

## 2019-01-19 RX ADMIN — FAMOTIDINE 20 MG: 20 TABLET ORAL at 08:32

## 2019-01-19 RX ADMIN — PALIPERIDONE 9 MG: 3 TABLET, EXTENDED RELEASE ORAL at 20:32

## 2019-01-19 RX ADMIN — LORAZEPAM 1 MG: 1 TABLET ORAL at 05:08

## 2019-01-19 RX ADMIN — DIVALPROEX SODIUM 750 MG: 500 TABLET, EXTENDED RELEASE ORAL at 20:32

## 2019-01-19 RX ADMIN — CETIRIZINE HYDROCHLORIDE 10 MG: 10 TABLET, FILM COATED ORAL at 08:33

## 2019-01-19 RX ADMIN — HYDROXYZINE PAMOATE 50 MG: 50 CAPSULE ORAL at 20:34

## 2019-01-19 RX ADMIN — HYDROXYZINE PAMOATE 50 MG: 50 CAPSULE ORAL at 08:32

## 2019-01-19 RX ADMIN — FAMOTIDINE 20 MG: 20 TABLET ORAL at 20:32

## 2019-01-19 RX ADMIN — TRAZODONE HYDROCHLORIDE 150 MG: 100 TABLET ORAL at 20:32

## 2019-01-19 RX ADMIN — CLOMIPRAMINE HYDROCHLORIDE 25 MG: 25 CAPSULE ORAL at 20:32

## 2019-01-19 RX ADMIN — DONEPEZIL HYDROCHLORIDE 5 MG: 5 TABLET, FILM COATED ORAL at 20:32

## 2019-01-19 RX ADMIN — LORAZEPAM 2 MG: 2 INJECTION INTRAMUSCULAR; INTRAVENOUS at 22:34

## 2019-01-20 LAB
ALBUMIN SERPL-MCNC: 3.4 GM/DL (ref 3.4–5)
ALP BLD-CCNC: 66 IU/L (ref 40–129)
ALT SERPL-CCNC: 20 U/L (ref 10–40)
AMMONIA: 46 UMOL/L (ref 11–51)
ANION GAP SERPL CALCULATED.3IONS-SCNC: 12 MMOL/L (ref 4–16)
AST SERPL-CCNC: 24 IU/L (ref 15–37)
BILIRUB SERPL-MCNC: 0.3 MG/DL (ref 0–1)
BUN BLDV-MCNC: 13 MG/DL (ref 6–23)
CALCIUM SERPL-MCNC: 9.8 MG/DL (ref 8.3–10.6)
CHLORIDE BLD-SCNC: 102 MMOL/L (ref 99–110)
CO2: 29 MMOL/L (ref 21–32)
CREAT SERPL-MCNC: 0.8 MG/DL (ref 0.6–1.1)
GFR AFRICAN AMERICAN: >60 ML/MIN/1.73M2
GFR NON-AFRICAN AMERICAN: >60 ML/MIN/1.73M2
GLUCOSE BLD-MCNC: 107 MG/DL (ref 70–99)
GLUCOSE BLD-MCNC: 138 MG/DL (ref 70–99)
POTASSIUM SERPL-SCNC: 4.1 MMOL/L (ref 3.5–5.1)
SODIUM BLD-SCNC: 143 MMOL/L (ref 135–145)
TOTAL PROTEIN: 7 GM/DL (ref 6.4–8.2)

## 2019-01-20 PROCEDURE — 82962 GLUCOSE BLOOD TEST: CPT

## 2019-01-20 PROCEDURE — 6370000000 HC RX 637 (ALT 250 FOR IP): Performed by: PSYCHIATRY & NEUROLOGY

## 2019-01-20 PROCEDURE — 1240000000 HC EMOTIONAL WELLNESS R&B

## 2019-01-20 PROCEDURE — 6370000000 HC RX 637 (ALT 250 FOR IP): Performed by: HOSPITALIST

## 2019-01-20 PROCEDURE — 82140 ASSAY OF AMMONIA: CPT

## 2019-01-20 PROCEDURE — 80053 COMPREHEN METABOLIC PANEL: CPT

## 2019-01-20 PROCEDURE — 36415 COLL VENOUS BLD VENIPUNCTURE: CPT

## 2019-01-20 PROCEDURE — 6360000002 HC RX W HCPCS: Performed by: PSYCHIATRY & NEUROLOGY

## 2019-01-20 PROCEDURE — 80164 ASSAY DIPROPYLACETIC ACD TOT: CPT

## 2019-01-20 RX ORDER — LORAZEPAM 1 MG/1
2 TABLET ORAL NIGHTLY
Status: DISCONTINUED | OUTPATIENT
Start: 2019-01-20 | End: 2019-02-01

## 2019-01-20 RX ORDER — SIMETHICONE 80 MG
80 TABLET,CHEWABLE ORAL EVERY 6 HOURS
Status: DISCONTINUED | OUTPATIENT
Start: 2019-01-20 | End: 2019-02-06 | Stop reason: HOSPADM

## 2019-01-20 RX ORDER — MEMANTINE HYDROCHLORIDE 5 MG/1
5 TABLET ORAL DAILY
Status: DISCONTINUED | OUTPATIENT
Start: 2019-01-20 | End: 2019-01-22

## 2019-01-20 RX ORDER — PALIPERIDONE 3 MG/1
3 TABLET, EXTENDED RELEASE ORAL NIGHTLY
Status: DISCONTINUED | OUTPATIENT
Start: 2019-01-20 | End: 2019-01-20

## 2019-01-20 RX ADMIN — FLUPHENAZINE HYDROCHLORIDE 5 MG: 2.5 INJECTION, SOLUTION INTRAMUSCULAR at 15:05

## 2019-01-20 RX ADMIN — METFORMIN HYDROCHLORIDE 500 MG: 500 TABLET ORAL at 11:56

## 2019-01-20 RX ADMIN — LORAZEPAM 2 MG: 2 INJECTION INTRAMUSCULAR; INTRAVENOUS at 15:04

## 2019-01-20 RX ADMIN — HYDROXYZINE PAMOATE 50 MG: 50 CAPSULE ORAL at 11:56

## 2019-01-20 RX ADMIN — LORAZEPAM 2 MG: 1 TABLET ORAL at 22:49

## 2019-01-20 RX ADMIN — FAMOTIDINE 20 MG: 20 TABLET ORAL at 11:56

## 2019-01-20 RX ADMIN — DIVALPROEX SODIUM 750 MG: 500 TABLET, EXTENDED RELEASE ORAL at 11:56

## 2019-01-20 ASSESSMENT — PAIN SCALES - GENERAL: PAINLEVEL_OUTOF10: 0

## 2019-01-21 LAB
ALBUMIN SERPL-MCNC: 3.3 GM/DL (ref 3.4–5)
ALP BLD-CCNC: 58 IU/L (ref 40–129)
ALT SERPL-CCNC: 18 U/L (ref 10–40)
ANION GAP SERPL CALCULATED.3IONS-SCNC: 8 MMOL/L (ref 4–16)
AST SERPL-CCNC: 22 IU/L (ref 15–37)
BILIRUB SERPL-MCNC: 0.3 MG/DL (ref 0–1)
BUN BLDV-MCNC: 11 MG/DL (ref 6–23)
CALCIUM SERPL-MCNC: 8.8 MG/DL (ref 8.3–10.6)
CHLORIDE BLD-SCNC: 102 MMOL/L (ref 99–110)
CO2: 32 MMOL/L (ref 21–32)
CREAT SERPL-MCNC: 0.7 MG/DL (ref 0.6–1.1)
GFR AFRICAN AMERICAN: >60 ML/MIN/1.73M2
GFR NON-AFRICAN AMERICAN: >60 ML/MIN/1.73M2
GLUCOSE BLD-MCNC: 102 MG/DL (ref 70–99)
POTASSIUM SERPL-SCNC: 4.1 MMOL/L (ref 3.5–5.1)
SODIUM BLD-SCNC: 142 MMOL/L (ref 135–145)
TOTAL PROTEIN: 6.2 GM/DL (ref 6.4–8.2)
TSH HIGH SENSITIVITY: 1.39 UIU/ML (ref 0.27–4.2)
VALPROIC ACID LEVEL: 91.6 UG/ML (ref 50–100)

## 2019-01-21 PROCEDURE — 80053 COMPREHEN METABOLIC PANEL: CPT

## 2019-01-21 PROCEDURE — 36415 COLL VENOUS BLD VENIPUNCTURE: CPT

## 2019-01-21 PROCEDURE — 6370000000 HC RX 637 (ALT 250 FOR IP): Performed by: PSYCHIATRY & NEUROLOGY

## 2019-01-21 PROCEDURE — 6360000002 HC RX W HCPCS: Performed by: PSYCHIATRY & NEUROLOGY

## 2019-01-21 PROCEDURE — 6370000000 HC RX 637 (ALT 250 FOR IP): Performed by: HOSPITALIST

## 2019-01-21 PROCEDURE — 97163 PT EVAL HIGH COMPLEX 45 MIN: CPT

## 2019-01-21 PROCEDURE — 51798 US URINE CAPACITY MEASURE: CPT

## 2019-01-21 PROCEDURE — 97530 THERAPEUTIC ACTIVITIES: CPT

## 2019-01-21 PROCEDURE — 1240000000 HC EMOTIONAL WELLNESS R&B

## 2019-01-21 PROCEDURE — 97167 OT EVAL HIGH COMPLEX 60 MIN: CPT

## 2019-01-21 PROCEDURE — 83655 ASSAY OF LEAD: CPT

## 2019-01-21 PROCEDURE — 84443 ASSAY THYROID STIM HORMONE: CPT

## 2019-01-21 PROCEDURE — 84436 ASSAY OF TOTAL THYROXINE: CPT

## 2019-01-21 PROCEDURE — 97535 SELF CARE MNGMENT TRAINING: CPT

## 2019-01-21 RX ORDER — TRAZODONE HYDROCHLORIDE 100 MG/1
100 TABLET ORAL NIGHTLY
Status: DISCONTINUED | OUTPATIENT
Start: 2019-01-21 | End: 2019-01-22

## 2019-01-21 RX ADMIN — LORAZEPAM 2 MG: 2 INJECTION INTRAMUSCULAR; INTRAVENOUS at 22:25

## 2019-01-21 RX ADMIN — FLUPHENAZINE HYDROCHLORIDE 5 MG: 2.5 INJECTION, SOLUTION INTRAMUSCULAR at 16:16

## 2019-01-21 RX ADMIN — LOPERAMIDE HYDROCHLORIDE 2 MG: 2 CAPSULE ORAL at 22:58

## 2019-01-21 RX ADMIN — FAMOTIDINE 20 MG: 20 TABLET ORAL at 09:29

## 2019-01-21 RX ADMIN — FLUPHENAZINE HYDROCHLORIDE 5 MG: 2.5 INJECTION, SOLUTION INTRAMUSCULAR at 22:25

## 2019-01-21 RX ADMIN — METFORMIN HYDROCHLORIDE 500 MG: 500 TABLET ORAL at 09:29

## 2019-01-21 ASSESSMENT — PAIN SCALES - GENERAL
PAINLEVEL_OUTOF10: 0

## 2019-01-22 LAB — T4 TOTAL: 6.21 UG/DL

## 2019-01-22 PROCEDURE — 6370000000 HC RX 637 (ALT 250 FOR IP): Performed by: PSYCHIATRY & NEUROLOGY

## 2019-01-22 PROCEDURE — 6360000002 HC RX W HCPCS: Performed by: PSYCHIATRY & NEUROLOGY

## 2019-01-22 PROCEDURE — 6370000000 HC RX 637 (ALT 250 FOR IP): Performed by: HOSPITALIST

## 2019-01-22 PROCEDURE — 1240000000 HC EMOTIONAL WELLNESS R&B

## 2019-01-22 RX ORDER — LITHIUM CARBONATE 300 MG/1
600 CAPSULE ORAL NIGHTLY
Status: DISCONTINUED | OUTPATIENT
Start: 2019-01-23 | End: 2019-02-06 | Stop reason: HOSPADM

## 2019-01-22 RX ORDER — FLUVOXAMINE MALEATE 50 MG/1
50 TABLET, COATED ORAL NIGHTLY
Status: DISCONTINUED | OUTPATIENT
Start: 2019-01-22 | End: 2019-01-22

## 2019-01-22 RX ORDER — LITHIUM CARBONATE 300 MG/1
600 CAPSULE ORAL NIGHTLY
Status: DISCONTINUED | OUTPATIENT
Start: 2019-01-22 | End: 2019-01-22

## 2019-01-22 RX ORDER — LITHIUM CARBONATE 300 MG/1
600 CAPSULE ORAL ONCE
Status: COMPLETED | OUTPATIENT
Start: 2019-01-22 | End: 2019-01-22

## 2019-01-22 RX ADMIN — SIMETHICONE CHEW TAB 80 MG 80 MG: 80 TABLET ORAL at 23:48

## 2019-01-22 RX ADMIN — LORAZEPAM 2 MG: 2 INJECTION INTRAMUSCULAR; INTRAVENOUS at 13:54

## 2019-01-22 RX ADMIN — LORAZEPAM 2 MG: 1 TABLET ORAL at 23:47

## 2019-01-22 RX ADMIN — FAMOTIDINE 20 MG: 20 TABLET ORAL at 23:48

## 2019-01-22 RX ADMIN — HYDROXYZINE PAMOATE 50 MG: 50 CAPSULE ORAL at 23:48

## 2019-01-22 RX ADMIN — FLUPHENAZINE HYDROCHLORIDE 5 MG: 2.5 INJECTION, SOLUTION INTRAMUSCULAR at 13:54

## 2019-01-22 RX ADMIN — DIVALPROEX SODIUM 750 MG: 500 TABLET, EXTENDED RELEASE ORAL at 23:47

## 2019-01-22 RX ADMIN — DIPHENHYDRAMINE HYDROCHLORIDE 50 MG: 50 INJECTION, SOLUTION INTRAMUSCULAR; INTRAVENOUS at 18:29

## 2019-01-22 RX ADMIN — LITHIUM CARBONATE 600 MG: 300 CAPSULE, GELATIN COATED ORAL at 16:27

## 2019-01-22 ASSESSMENT — PAIN SCALES - GENERAL: PAINLEVEL_OUTOF10: 0

## 2019-01-23 LAB — GLUCOSE BLD-MCNC: 193 MG/DL (ref 70–99)

## 2019-01-23 PROCEDURE — 6370000000 HC RX 637 (ALT 250 FOR IP): Performed by: HOSPITALIST

## 2019-01-23 PROCEDURE — 6370000000 HC RX 637 (ALT 250 FOR IP): Performed by: PSYCHIATRY & NEUROLOGY

## 2019-01-23 PROCEDURE — 82962 GLUCOSE BLOOD TEST: CPT

## 2019-01-23 PROCEDURE — 1240000000 HC EMOTIONAL WELLNESS R&B

## 2019-01-23 RX ADMIN — ACETAMINOPHEN 650 MG: 325 TABLET ORAL at 23:28

## 2019-01-23 RX ADMIN — SIMETHICONE CHEW TAB 80 MG 80 MG: 80 TABLET ORAL at 13:28

## 2019-01-23 RX ADMIN — DIVALPROEX SODIUM 750 MG: 500 TABLET, EXTENDED RELEASE ORAL at 22:03

## 2019-01-23 RX ADMIN — LORAZEPAM 2 MG: 1 TABLET ORAL at 22:04

## 2019-01-23 RX ADMIN — ACETAMINOPHEN 650 MG: 325 TABLET ORAL at 18:04

## 2019-01-23 RX ADMIN — FAMOTIDINE 20 MG: 20 TABLET ORAL at 22:03

## 2019-01-23 RX ADMIN — SIMETHICONE CHEW TAB 80 MG 80 MG: 80 TABLET ORAL at 23:28

## 2019-01-23 RX ADMIN — SIMETHICONE CHEW TAB 80 MG 80 MG: 80 TABLET ORAL at 18:04

## 2019-01-23 RX ADMIN — FAMOTIDINE 20 MG: 20 TABLET ORAL at 09:34

## 2019-01-23 RX ADMIN — LITHIUM CARBONATE 600 MG: 300 CAPSULE, GELATIN COATED ORAL at 22:02

## 2019-01-23 RX ADMIN — HYDROXYZINE PAMOATE 50 MG: 50 CAPSULE ORAL at 22:04

## 2019-01-23 RX ADMIN — METFORMIN HYDROCHLORIDE 500 MG: 500 TABLET ORAL at 09:34

## 2019-01-23 RX ADMIN — DIVALPROEX SODIUM 750 MG: 500 TABLET, EXTENDED RELEASE ORAL at 09:34

## 2019-01-23 RX ADMIN — CETIRIZINE HYDROCHLORIDE 10 MG: 10 TABLET, FILM COATED ORAL at 09:34

## 2019-01-23 ASSESSMENT — PAIN SCALES - GENERAL
PAINLEVEL_OUTOF10: 8
PAINLEVEL_OUTOF10: 3

## 2019-01-24 LAB — GLUCOSE BLD-MCNC: 119 MG/DL (ref 70–99)

## 2019-01-24 PROCEDURE — 1240000000 HC EMOTIONAL WELLNESS R&B

## 2019-01-24 PROCEDURE — 6370000000 HC RX 637 (ALT 250 FOR IP): Performed by: PSYCHIATRY & NEUROLOGY

## 2019-01-24 PROCEDURE — 6370000000 HC RX 637 (ALT 250 FOR IP): Performed by: HOSPITALIST

## 2019-01-24 PROCEDURE — 82962 GLUCOSE BLOOD TEST: CPT

## 2019-01-24 RX ADMIN — METFORMIN HYDROCHLORIDE 500 MG: 500 TABLET ORAL at 12:16

## 2019-01-24 RX ADMIN — SIMETHICONE CHEW TAB 80 MG 80 MG: 80 TABLET ORAL at 20:22

## 2019-01-24 RX ADMIN — FAMOTIDINE 20 MG: 20 TABLET ORAL at 12:16

## 2019-01-24 RX ADMIN — CETIRIZINE HYDROCHLORIDE 10 MG: 10 TABLET, FILM COATED ORAL at 12:35

## 2019-01-24 RX ADMIN — LITHIUM CARBONATE 600 MG: 300 CAPSULE, GELATIN COATED ORAL at 21:05

## 2019-01-24 RX ADMIN — FAMOTIDINE 20 MG: 20 TABLET ORAL at 21:06

## 2019-01-24 RX ADMIN — LORAZEPAM 2 MG: 1 TABLET ORAL at 21:06

## 2019-01-24 RX ADMIN — DIVALPROEX SODIUM 750 MG: 500 TABLET, EXTENDED RELEASE ORAL at 21:06

## 2019-01-24 RX ADMIN — ACETAMINOPHEN 650 MG: 325 TABLET ORAL at 12:17

## 2019-01-24 RX ADMIN — ACETAMINOPHEN 650 MG: 325 TABLET ORAL at 20:22

## 2019-01-24 RX ADMIN — DIVALPROEX SODIUM 750 MG: 500 TABLET, EXTENDED RELEASE ORAL at 12:16

## 2019-01-24 ASSESSMENT — PAIN SCALES - GENERAL
PAINLEVEL_OUTOF10: 0
PAINLEVEL_OUTOF10: 3
PAINLEVEL_OUTOF10: 1

## 2019-01-24 ASSESSMENT — PAIN DESCRIPTION - PAIN TYPE: TYPE: ACUTE PAIN

## 2019-01-24 ASSESSMENT — PAIN DESCRIPTION - ORIENTATION: ORIENTATION: LOWER;MID

## 2019-01-24 ASSESSMENT — PAIN DESCRIPTION - LOCATION: LOCATION: BACK

## 2019-01-25 ENCOUNTER — APPOINTMENT (OUTPATIENT)
Dept: CT IMAGING | Age: 64
DRG: 885 | End: 2019-01-25
Attending: PSYCHIATRY & NEUROLOGY
Payer: MEDICARE

## 2019-01-25 LAB
GLUCOSE BLD-MCNC: 154 MG/DL (ref 70–99)
GLUCOSE BLD-MCNC: 162 MG/DL (ref 70–99)

## 2019-01-25 PROCEDURE — 6370000000 HC RX 637 (ALT 250 FOR IP): Performed by: HOSPITALIST

## 2019-01-25 PROCEDURE — 82962 GLUCOSE BLOOD TEST: CPT

## 2019-01-25 PROCEDURE — 6370000000 HC RX 637 (ALT 250 FOR IP): Performed by: PSYCHIATRY & NEUROLOGY

## 2019-01-25 PROCEDURE — 1240000000 HC EMOTIONAL WELLNESS R&B

## 2019-01-25 PROCEDURE — 6360000004 HC RX CONTRAST MEDICATION: Performed by: PSYCHIATRY & NEUROLOGY

## 2019-01-25 PROCEDURE — 97530 THERAPEUTIC ACTIVITIES: CPT

## 2019-01-25 PROCEDURE — 70470 CT HEAD/BRAIN W/O & W/DYE: CPT

## 2019-01-25 RX ORDER — METFORMIN HYDROCHLORIDE 500 MG/1
500 TABLET, EXTENDED RELEASE ORAL
Status: DISCONTINUED | OUTPATIENT
Start: 2019-01-28 | End: 2019-02-06 | Stop reason: HOSPADM

## 2019-01-25 RX ADMIN — SIMETHICONE CHEW TAB 80 MG 80 MG: 80 TABLET ORAL at 15:16

## 2019-01-25 RX ADMIN — METFORMIN HYDROCHLORIDE 500 MG: 500 TABLET ORAL at 08:11

## 2019-01-25 RX ADMIN — IOPAMIDOL 71 ML: 755 INJECTION, SOLUTION INTRAVENOUS at 15:00

## 2019-01-25 RX ADMIN — DIVALPROEX SODIUM 750 MG: 500 TABLET, EXTENDED RELEASE ORAL at 08:11

## 2019-01-25 RX ADMIN — SIMETHICONE CHEW TAB 80 MG 80 MG: 80 TABLET ORAL at 20:24

## 2019-01-25 RX ADMIN — FAMOTIDINE 20 MG: 20 TABLET ORAL at 08:11

## 2019-01-25 RX ADMIN — LORAZEPAM 2 MG: 1 TABLET ORAL at 20:24

## 2019-01-25 RX ADMIN — SIMETHICONE CHEW TAB 80 MG 80 MG: 80 TABLET ORAL at 08:11

## 2019-01-25 RX ADMIN — LITHIUM CARBONATE 600 MG: 300 CAPSULE, GELATIN COATED ORAL at 20:24

## 2019-01-25 RX ADMIN — CETIRIZINE HYDROCHLORIDE 10 MG: 10 TABLET, FILM COATED ORAL at 08:10

## 2019-01-25 RX ADMIN — LOPERAMIDE HYDROCHLORIDE 2 MG: 2 CAPSULE ORAL at 21:27

## 2019-01-25 RX ADMIN — DIVALPROEX SODIUM 750 MG: 500 TABLET, EXTENDED RELEASE ORAL at 20:25

## 2019-01-25 RX ADMIN — ACETAMINOPHEN 650 MG: 325 TABLET ORAL at 18:18

## 2019-01-25 RX ADMIN — FAMOTIDINE 20 MG: 20 TABLET ORAL at 20:24

## 2019-01-25 ASSESSMENT — PAIN DESCRIPTION - ORIENTATION: ORIENTATION: OUTER

## 2019-01-25 ASSESSMENT — PAIN DESCRIPTION - LOCATION: LOCATION: HEAD

## 2019-01-25 ASSESSMENT — PAIN DESCRIPTION - PAIN TYPE: TYPE: ACUTE PAIN

## 2019-01-25 ASSESSMENT — PAIN SCALES - GENERAL
PAINLEVEL_OUTOF10: 1
PAINLEVEL_OUTOF10: 3

## 2019-01-26 PROCEDURE — 1240000000 HC EMOTIONAL WELLNESS R&B

## 2019-01-26 PROCEDURE — 6370000000 HC RX 637 (ALT 250 FOR IP): Performed by: PSYCHIATRY & NEUROLOGY

## 2019-01-26 PROCEDURE — 6370000000 HC RX 637 (ALT 250 FOR IP): Performed by: HOSPITALIST

## 2019-01-26 RX ADMIN — LOPERAMIDE HYDROCHLORIDE 2 MG: 2 CAPSULE ORAL at 20:27

## 2019-01-26 RX ADMIN — SIMETHICONE CHEW TAB 80 MG 80 MG: 80 TABLET ORAL at 08:18

## 2019-01-26 RX ADMIN — LORAZEPAM 2 MG: 1 TABLET ORAL at 20:26

## 2019-01-26 RX ADMIN — DIVALPROEX SODIUM 750 MG: 500 TABLET, EXTENDED RELEASE ORAL at 08:17

## 2019-01-26 RX ADMIN — CETIRIZINE HYDROCHLORIDE 10 MG: 10 TABLET, FILM COATED ORAL at 08:18

## 2019-01-26 RX ADMIN — DIVALPROEX SODIUM 250 MG: 500 TABLET, EXTENDED RELEASE ORAL at 20:27

## 2019-01-26 RX ADMIN — FAMOTIDINE 20 MG: 20 TABLET ORAL at 08:17

## 2019-01-26 RX ADMIN — LITHIUM CARBONATE 600 MG: 300 CAPSULE, GELATIN COATED ORAL at 20:27

## 2019-01-27 LAB — LEAD LEVEL BLOOD: <2

## 2019-01-27 PROCEDURE — 6370000000 HC RX 637 (ALT 250 FOR IP): Performed by: HOSPITALIST

## 2019-01-27 PROCEDURE — 6370000000 HC RX 637 (ALT 250 FOR IP): Performed by: PSYCHIATRY & NEUROLOGY

## 2019-01-27 PROCEDURE — 1240000000 HC EMOTIONAL WELLNESS R&B

## 2019-01-27 RX ADMIN — LORAZEPAM 1 MG: 1 TABLET ORAL at 08:38

## 2019-01-27 RX ADMIN — FAMOTIDINE 20 MG: 20 TABLET ORAL at 20:29

## 2019-01-27 RX ADMIN — CETIRIZINE HYDROCHLORIDE 10 MG: 10 TABLET, FILM COATED ORAL at 08:38

## 2019-01-27 RX ADMIN — DIVALPROEX SODIUM 750 MG: 500 TABLET, EXTENDED RELEASE ORAL at 20:30

## 2019-01-27 RX ADMIN — HYDROXYZINE PAMOATE 50 MG: 50 CAPSULE ORAL at 20:30

## 2019-01-27 RX ADMIN — HYDROXYZINE PAMOATE 50 MG: 50 CAPSULE ORAL at 02:44

## 2019-01-27 RX ADMIN — DIVALPROEX SODIUM 750 MG: 500 TABLET, EXTENDED RELEASE ORAL at 08:37

## 2019-01-27 RX ADMIN — LITHIUM CARBONATE 600 MG: 300 CAPSULE, GELATIN COATED ORAL at 20:29

## 2019-01-27 RX ADMIN — SIMETHICONE CHEW TAB 80 MG 80 MG: 80 TABLET ORAL at 08:39

## 2019-01-27 RX ADMIN — FLUTICASONE PROPIONATE 1 SPRAY: 50 SPRAY, METERED NASAL at 08:39

## 2019-01-27 RX ADMIN — FAMOTIDINE 20 MG: 20 TABLET ORAL at 08:38

## 2019-01-27 RX ADMIN — LORAZEPAM 2 MG: 1 TABLET ORAL at 20:30

## 2019-01-28 LAB — GLUCOSE BLD-MCNC: 119 MG/DL (ref 70–99)

## 2019-01-28 PROCEDURE — 6360000002 HC RX W HCPCS: Performed by: PSYCHIATRY & NEUROLOGY

## 2019-01-28 PROCEDURE — 6370000000 HC RX 637 (ALT 250 FOR IP): Performed by: HOSPITALIST

## 2019-01-28 PROCEDURE — 82962 GLUCOSE BLOOD TEST: CPT

## 2019-01-28 PROCEDURE — 6370000000 HC RX 637 (ALT 250 FOR IP): Performed by: PSYCHIATRY & NEUROLOGY

## 2019-01-28 PROCEDURE — 1240000000 HC EMOTIONAL WELLNESS R&B

## 2019-01-28 RX ADMIN — SIMETHICONE CHEW TAB 80 MG 80 MG: 80 TABLET ORAL at 23:28

## 2019-01-28 RX ADMIN — LITHIUM CARBONATE 600 MG: 300 CAPSULE, GELATIN COATED ORAL at 23:27

## 2019-01-28 RX ADMIN — LORAZEPAM 2 MG: 1 TABLET ORAL at 23:28

## 2019-01-28 RX ADMIN — DIPHENHYDRAMINE HYDROCHLORIDE 50 MG: 50 INJECTION, SOLUTION INTRAMUSCULAR; INTRAVENOUS at 03:09

## 2019-01-28 RX ADMIN — LORAZEPAM 2 MG: 2 INJECTION INTRAMUSCULAR; INTRAVENOUS at 03:09

## 2019-01-28 RX ADMIN — HYDROXYZINE PAMOATE 50 MG: 50 CAPSULE ORAL at 23:28

## 2019-01-28 RX ADMIN — FAMOTIDINE 20 MG: 20 TABLET ORAL at 23:28

## 2019-01-28 RX ADMIN — METFORMIN HYDROCHLORIDE 500 MG: 500 TABLET, EXTENDED RELEASE ORAL at 09:45

## 2019-01-28 RX ADMIN — CETIRIZINE HYDROCHLORIDE 10 MG: 10 TABLET, FILM COATED ORAL at 09:50

## 2019-01-28 RX ADMIN — FAMOTIDINE 20 MG: 20 TABLET ORAL at 09:47

## 2019-01-28 RX ADMIN — DIVALPROEX SODIUM 750 MG: 500 TABLET, EXTENDED RELEASE ORAL at 09:50

## 2019-01-28 RX ADMIN — SIMETHICONE CHEW TAB 80 MG 80 MG: 80 TABLET ORAL at 09:59

## 2019-01-28 RX ADMIN — DIVALPROEX SODIUM 750 MG: 500 TABLET, EXTENDED RELEASE ORAL at 23:27

## 2019-01-29 LAB — GLUCOSE BLD-MCNC: 143 MG/DL (ref 70–99)

## 2019-01-29 PROCEDURE — 82962 GLUCOSE BLOOD TEST: CPT

## 2019-01-29 PROCEDURE — 1240000000 HC EMOTIONAL WELLNESS R&B

## 2019-01-29 PROCEDURE — 6370000000 HC RX 637 (ALT 250 FOR IP): Performed by: PSYCHIATRY & NEUROLOGY

## 2019-01-29 PROCEDURE — 6370000000 HC RX 637 (ALT 250 FOR IP): Performed by: HOSPITALIST

## 2019-01-29 RX ADMIN — FAMOTIDINE 20 MG: 20 TABLET ORAL at 08:45

## 2019-01-29 RX ADMIN — DIVALPROEX SODIUM 750 MG: 500 TABLET, EXTENDED RELEASE ORAL at 08:45

## 2019-01-29 RX ADMIN — SIMETHICONE CHEW TAB 80 MG 80 MG: 80 TABLET ORAL at 08:45

## 2019-01-29 RX ADMIN — LITHIUM CARBONATE 600 MG: 300 CAPSULE, GELATIN COATED ORAL at 20:54

## 2019-01-29 RX ADMIN — LORAZEPAM 2 MG: 1 TABLET ORAL at 20:53

## 2019-01-29 RX ADMIN — FAMOTIDINE 20 MG: 20 TABLET ORAL at 20:53

## 2019-01-29 RX ADMIN — SIMETHICONE CHEW TAB 80 MG 80 MG: 80 TABLET ORAL at 20:53

## 2019-01-29 RX ADMIN — METFORMIN HYDROCHLORIDE 500 MG: 500 TABLET, EXTENDED RELEASE ORAL at 08:45

## 2019-01-29 RX ADMIN — CETIRIZINE HYDROCHLORIDE 10 MG: 10 TABLET, FILM COATED ORAL at 08:45

## 2019-01-29 RX ADMIN — DIVALPROEX SODIUM 750 MG: 500 TABLET, EXTENDED RELEASE ORAL at 20:53

## 2019-01-29 RX ADMIN — HYDROXYZINE PAMOATE 50 MG: 50 CAPSULE ORAL at 20:53

## 2019-01-29 RX ADMIN — FLUTICASONE PROPIONATE 1 SPRAY: 50 SPRAY, METERED NASAL at 08:45

## 2019-01-30 LAB — GLUCOSE BLD-MCNC: 109 MG/DL (ref 70–99)

## 2019-01-30 PROCEDURE — 82962 GLUCOSE BLOOD TEST: CPT

## 2019-01-30 PROCEDURE — 6370000000 HC RX 637 (ALT 250 FOR IP): Performed by: PSYCHIATRY & NEUROLOGY

## 2019-01-30 PROCEDURE — 6370000000 HC RX 637 (ALT 250 FOR IP): Performed by: HOSPITALIST

## 2019-01-30 PROCEDURE — 1240000000 HC EMOTIONAL WELLNESS R&B

## 2019-01-30 RX ADMIN — SIMETHICONE CHEW TAB 80 MG 80 MG: 80 TABLET ORAL at 06:08

## 2019-01-30 RX ADMIN — CETIRIZINE HYDROCHLORIDE 10 MG: 10 TABLET, FILM COATED ORAL at 08:51

## 2019-01-30 RX ADMIN — SIMETHICONE CHEW TAB 80 MG 80 MG: 80 TABLET ORAL at 13:20

## 2019-01-30 RX ADMIN — SIMETHICONE CHEW TAB 80 MG 80 MG: 80 TABLET ORAL at 01:42

## 2019-01-30 RX ADMIN — FAMOTIDINE 20 MG: 20 TABLET ORAL at 22:06

## 2019-01-30 RX ADMIN — HYDROXYZINE PAMOATE 50 MG: 50 CAPSULE ORAL at 22:07

## 2019-01-30 RX ADMIN — SIMETHICONE CHEW TAB 80 MG 80 MG: 80 TABLET ORAL at 22:06

## 2019-01-30 RX ADMIN — DIVALPROEX SODIUM 750 MG: 500 TABLET, EXTENDED RELEASE ORAL at 08:51

## 2019-01-30 RX ADMIN — METFORMIN HYDROCHLORIDE 500 MG: 500 TABLET, EXTENDED RELEASE ORAL at 08:51

## 2019-01-30 RX ADMIN — FLUTICASONE PROPIONATE 1 SPRAY: 50 SPRAY, METERED NASAL at 08:51

## 2019-01-30 RX ADMIN — LITHIUM CARBONATE 600 MG: 300 CAPSULE, GELATIN COATED ORAL at 22:07

## 2019-01-30 RX ADMIN — DIVALPROEX SODIUM 750 MG: 500 TABLET, EXTENDED RELEASE ORAL at 22:06

## 2019-01-30 RX ADMIN — FAMOTIDINE 20 MG: 20 TABLET ORAL at 08:51

## 2019-01-30 RX ADMIN — LORAZEPAM 2 MG: 1 TABLET ORAL at 22:07

## 2019-01-30 RX ADMIN — LORAZEPAM 1 MG: 1 TABLET ORAL at 00:46

## 2019-01-31 PROBLEM — Z15.89 HTR2A GG GENOTYPE: Status: ACTIVE | Noted: 2019-01-31

## 2019-01-31 PROBLEM — Z15.89 BIALLELIC MUTATION OF CYP2D6 GENE: Status: ACTIVE | Noted: 2019-01-31

## 2019-01-31 PROBLEM — Z15.89 MONOALLELIC MUTATION OF SLC6A4 GENE: Status: ACTIVE | Noted: 2019-01-31

## 2019-01-31 LAB
ALBUMIN SERPL-MCNC: 3.9 GM/DL (ref 3.4–5)
ALP BLD-CCNC: 70 IU/L (ref 40–129)
ALT SERPL-CCNC: 28 U/L (ref 10–40)
ANION GAP SERPL CALCULATED.3IONS-SCNC: 14 MMOL/L (ref 4–16)
AST SERPL-CCNC: 27 IU/L (ref 15–37)
BILIRUB SERPL-MCNC: 0.3 MG/DL (ref 0–1)
BUN BLDV-MCNC: 8 MG/DL (ref 6–23)
CALCIUM SERPL-MCNC: 9.7 MG/DL (ref 8.3–10.6)
CHLORIDE BLD-SCNC: 104 MMOL/L (ref 99–110)
CO2: 26 MMOL/L (ref 21–32)
CREAT SERPL-MCNC: 0.9 MG/DL (ref 0.6–1.1)
GFR AFRICAN AMERICAN: >60 ML/MIN/1.73M2
GFR NON-AFRICAN AMERICAN: >60 ML/MIN/1.73M2
GLUCOSE BLD-MCNC: 144 MG/DL (ref 70–99)
LITHIUM LEVEL: 0.8 MMOL/L (ref 0.6–1.2)
POTASSIUM SERPL-SCNC: 4 MMOL/L (ref 3.5–5.1)
SODIUM BLD-SCNC: 144 MMOL/L (ref 135–145)
TOTAL PROTEIN: 5.8 GM/DL (ref 6.4–8.2)

## 2019-01-31 PROCEDURE — 97530 THERAPEUTIC ACTIVITIES: CPT

## 2019-01-31 PROCEDURE — 97110 THERAPEUTIC EXERCISES: CPT

## 2019-01-31 PROCEDURE — 80053 COMPREHEN METABOLIC PANEL: CPT

## 2019-01-31 PROCEDURE — 6360000002 HC RX W HCPCS: Performed by: PSYCHIATRY & NEUROLOGY

## 2019-01-31 PROCEDURE — 80178 ASSAY OF LITHIUM: CPT

## 2019-01-31 PROCEDURE — 6370000000 HC RX 637 (ALT 250 FOR IP): Performed by: PSYCHIATRY & NEUROLOGY

## 2019-01-31 PROCEDURE — 6370000000 HC RX 637 (ALT 250 FOR IP): Performed by: HOSPITALIST

## 2019-01-31 PROCEDURE — 36415 COLL VENOUS BLD VENIPUNCTURE: CPT

## 2019-01-31 PROCEDURE — 1240000000 HC EMOTIONAL WELLNESS R&B

## 2019-01-31 RX ORDER — DIPHENHYDRAMINE HYDROCHLORIDE 50 MG/ML
25 INJECTION INTRAMUSCULAR; INTRAVENOUS EVERY 6 HOURS PRN
Status: DISCONTINUED | OUTPATIENT
Start: 2019-01-31 | End: 2019-02-06 | Stop reason: HOSPADM

## 2019-01-31 RX ORDER — LORAZEPAM 2 MG/ML
1 INJECTION INTRAMUSCULAR EVERY 6 HOURS PRN
Status: DISCONTINUED | OUTPATIENT
Start: 2019-01-31 | End: 2019-02-06 | Stop reason: HOSPADM

## 2019-01-31 RX ORDER — FLUPHENAZINE HYDROCHLORIDE 2.5 MG/ML
2.5 INJECTION, SOLUTION INTRAMUSCULAR EVERY 6 HOURS PRN
Status: DISCONTINUED | OUTPATIENT
Start: 2019-01-31 | End: 2019-02-06 | Stop reason: HOSPADM

## 2019-01-31 RX ADMIN — DIVALPROEX SODIUM 750 MG: 500 TABLET, EXTENDED RELEASE ORAL at 20:27

## 2019-01-31 RX ADMIN — LORAZEPAM 2 MG: 1 TABLET ORAL at 20:28

## 2019-01-31 RX ADMIN — SIMETHICONE CHEW TAB 80 MG 80 MG: 80 TABLET ORAL at 20:28

## 2019-01-31 RX ADMIN — LITHIUM CARBONATE 600 MG: 300 CAPSULE, GELATIN COATED ORAL at 20:27

## 2019-01-31 RX ADMIN — FAMOTIDINE 20 MG: 20 TABLET ORAL at 20:28

## 2019-01-31 RX ADMIN — DIPHENHYDRAMINE HYDROCHLORIDE 50 MG: 50 INJECTION, SOLUTION INTRAMUSCULAR; INTRAVENOUS at 01:29

## 2019-01-31 RX ADMIN — LORAZEPAM 2 MG: 2 INJECTION INTRAMUSCULAR; INTRAVENOUS at 01:30

## 2019-01-31 ASSESSMENT — PAIN SCALES - GENERAL: PAINLEVEL_OUTOF10: 0

## 2019-02-01 PROCEDURE — 97530 THERAPEUTIC ACTIVITIES: CPT

## 2019-02-01 PROCEDURE — 6370000000 HC RX 637 (ALT 250 FOR IP): Performed by: PSYCHIATRY & NEUROLOGY

## 2019-02-01 PROCEDURE — 97535 SELF CARE MNGMENT TRAINING: CPT

## 2019-02-01 PROCEDURE — 6370000000 HC RX 637 (ALT 250 FOR IP): Performed by: HOSPITALIST

## 2019-02-01 PROCEDURE — 1240000000 HC EMOTIONAL WELLNESS R&B

## 2019-02-01 RX ADMIN — DIVALPROEX SODIUM 750 MG: 500 TABLET, EXTENDED RELEASE ORAL at 20:22

## 2019-02-01 RX ADMIN — FAMOTIDINE 20 MG: 20 TABLET ORAL at 20:22

## 2019-02-01 RX ADMIN — CETIRIZINE HYDROCHLORIDE 10 MG: 10 TABLET, FILM COATED ORAL at 09:49

## 2019-02-01 RX ADMIN — SIMETHICONE CHEW TAB 80 MG 80 MG: 80 TABLET ORAL at 20:22

## 2019-02-01 RX ADMIN — DIVALPROEX SODIUM 750 MG: 500 TABLET, EXTENDED RELEASE ORAL at 09:49

## 2019-02-01 RX ADMIN — SIMETHICONE CHEW TAB 80 MG 80 MG: 80 TABLET ORAL at 09:50

## 2019-02-01 RX ADMIN — METFORMIN HYDROCHLORIDE 500 MG: 500 TABLET, EXTENDED RELEASE ORAL at 09:49

## 2019-02-01 RX ADMIN — FAMOTIDINE 20 MG: 20 TABLET ORAL at 09:49

## 2019-02-01 RX ADMIN — LITHIUM CARBONATE 600 MG: 300 CAPSULE, GELATIN COATED ORAL at 20:22

## 2019-02-02 LAB — GLUCOSE BLD-MCNC: 127 MG/DL (ref 70–99)

## 2019-02-02 PROCEDURE — 1240000000 HC EMOTIONAL WELLNESS R&B

## 2019-02-02 PROCEDURE — 82962 GLUCOSE BLOOD TEST: CPT

## 2019-02-02 PROCEDURE — 6370000000 HC RX 637 (ALT 250 FOR IP): Performed by: PSYCHIATRY & NEUROLOGY

## 2019-02-02 PROCEDURE — 6370000000 HC RX 637 (ALT 250 FOR IP): Performed by: HOSPITALIST

## 2019-02-02 RX ADMIN — FAMOTIDINE 20 MG: 20 TABLET ORAL at 21:02

## 2019-02-02 RX ADMIN — METFORMIN HYDROCHLORIDE 500 MG: 500 TABLET, EXTENDED RELEASE ORAL at 08:20

## 2019-02-02 RX ADMIN — LITHIUM CARBONATE 600 MG: 300 CAPSULE, GELATIN COATED ORAL at 21:02

## 2019-02-02 RX ADMIN — DIVALPROEX SODIUM 750 MG: 500 TABLET, EXTENDED RELEASE ORAL at 08:17

## 2019-02-02 RX ADMIN — SIMETHICONE CHEW TAB 80 MG 80 MG: 80 TABLET ORAL at 21:02

## 2019-02-02 RX ADMIN — LOPERAMIDE HYDROCHLORIDE 2 MG: 2 CAPSULE ORAL at 21:04

## 2019-02-02 RX ADMIN — SIMETHICONE CHEW TAB 80 MG 80 MG: 80 TABLET ORAL at 08:20

## 2019-02-02 RX ADMIN — FAMOTIDINE 20 MG: 20 TABLET ORAL at 08:25

## 2019-02-02 RX ADMIN — DIVALPROEX SODIUM 750 MG: 500 TABLET, EXTENDED RELEASE ORAL at 21:02

## 2019-02-02 RX ADMIN — CETIRIZINE HYDROCHLORIDE 10 MG: 10 TABLET, FILM COATED ORAL at 08:23

## 2019-02-02 RX ADMIN — SIMETHICONE CHEW TAB 80 MG 80 MG: 80 TABLET ORAL at 14:14

## 2019-02-02 ASSESSMENT — PAIN SCALES - GENERAL: PAINLEVEL_OUTOF10: 0

## 2019-02-03 LAB — GLUCOSE BLD-MCNC: 111 MG/DL (ref 70–99)

## 2019-02-03 PROCEDURE — 6370000000 HC RX 637 (ALT 250 FOR IP): Performed by: PSYCHIATRY & NEUROLOGY

## 2019-02-03 PROCEDURE — 1240000000 HC EMOTIONAL WELLNESS R&B

## 2019-02-03 PROCEDURE — 6370000000 HC RX 637 (ALT 250 FOR IP): Performed by: HOSPITALIST

## 2019-02-03 PROCEDURE — 82962 GLUCOSE BLOOD TEST: CPT

## 2019-02-03 RX ADMIN — METFORMIN HYDROCHLORIDE 500 MG: 500 TABLET, EXTENDED RELEASE ORAL at 08:34

## 2019-02-03 RX ADMIN — FAMOTIDINE 20 MG: 20 TABLET ORAL at 08:34

## 2019-02-03 RX ADMIN — SIMETHICONE CHEW TAB 80 MG 80 MG: 80 TABLET ORAL at 20:39

## 2019-02-03 RX ADMIN — SIMETHICONE CHEW TAB 80 MG 80 MG: 80 TABLET ORAL at 08:34

## 2019-02-03 RX ADMIN — DIVALPROEX SODIUM 750 MG: 500 TABLET, EXTENDED RELEASE ORAL at 08:34

## 2019-02-03 RX ADMIN — FAMOTIDINE 20 MG: 20 TABLET ORAL at 20:39

## 2019-02-03 RX ADMIN — LITHIUM CARBONATE 600 MG: 300 CAPSULE, GELATIN COATED ORAL at 20:39

## 2019-02-03 RX ADMIN — DIVALPROEX SODIUM 750 MG: 500 TABLET, EXTENDED RELEASE ORAL at 20:39

## 2019-02-03 ASSESSMENT — PAIN SCALES - GENERAL: PAINLEVEL_OUTOF10: 0

## 2019-02-04 PROCEDURE — 6370000000 HC RX 637 (ALT 250 FOR IP): Performed by: PSYCHIATRY & NEUROLOGY

## 2019-02-04 PROCEDURE — 6370000000 HC RX 637 (ALT 250 FOR IP): Performed by: HOSPITALIST

## 2019-02-04 PROCEDURE — 1240000000 HC EMOTIONAL WELLNESS R&B

## 2019-02-04 RX ADMIN — FAMOTIDINE 20 MG: 20 TABLET ORAL at 21:00

## 2019-02-04 RX ADMIN — METFORMIN HYDROCHLORIDE 500 MG: 500 TABLET, EXTENDED RELEASE ORAL at 07:56

## 2019-02-04 RX ADMIN — SIMETHICONE CHEW TAB 80 MG 80 MG: 80 TABLET ORAL at 07:56

## 2019-02-04 RX ADMIN — DIVALPROEX SODIUM 750 MG: 500 TABLET, EXTENDED RELEASE ORAL at 07:56

## 2019-02-04 RX ADMIN — SIMETHICONE CHEW TAB 80 MG 80 MG: 80 TABLET ORAL at 21:00

## 2019-02-04 RX ADMIN — DIVALPROEX SODIUM 750 MG: 500 TABLET, EXTENDED RELEASE ORAL at 21:00

## 2019-02-04 RX ADMIN — LITHIUM CARBONATE 600 MG: 300 CAPSULE, GELATIN COATED ORAL at 21:00

## 2019-02-04 RX ADMIN — CETIRIZINE HYDROCHLORIDE 10 MG: 10 TABLET, FILM COATED ORAL at 07:56

## 2019-02-04 RX ADMIN — LOPERAMIDE HYDROCHLORIDE 2 MG: 2 CAPSULE ORAL at 12:13

## 2019-02-04 RX ADMIN — FAMOTIDINE 20 MG: 20 TABLET ORAL at 07:56

## 2019-02-05 PROCEDURE — 6370000000 HC RX 637 (ALT 250 FOR IP): Performed by: HOSPITALIST

## 2019-02-05 PROCEDURE — 1240000000 HC EMOTIONAL WELLNESS R&B

## 2019-02-05 PROCEDURE — 6370000000 HC RX 637 (ALT 250 FOR IP): Performed by: PSYCHIATRY & NEUROLOGY

## 2019-02-05 RX ADMIN — DIVALPROEX SODIUM 750 MG: 500 TABLET, EXTENDED RELEASE ORAL at 20:28

## 2019-02-05 RX ADMIN — DIVALPROEX SODIUM 750 MG: 500 TABLET, EXTENDED RELEASE ORAL at 07:46

## 2019-02-05 RX ADMIN — SIMETHICONE CHEW TAB 80 MG 80 MG: 80 TABLET ORAL at 06:23

## 2019-02-05 RX ADMIN — ACETAMINOPHEN 650 MG: 325 TABLET ORAL at 20:40

## 2019-02-05 RX ADMIN — CETIRIZINE HYDROCHLORIDE 10 MG: 10 TABLET, FILM COATED ORAL at 07:48

## 2019-02-05 RX ADMIN — FAMOTIDINE 20 MG: 20 TABLET ORAL at 20:28

## 2019-02-05 RX ADMIN — FAMOTIDINE 20 MG: 20 TABLET ORAL at 07:45

## 2019-02-05 RX ADMIN — LITHIUM CARBONATE 600 MG: 300 CAPSULE, GELATIN COATED ORAL at 20:28

## 2019-02-05 RX ADMIN — SIMETHICONE CHEW TAB 80 MG 80 MG: 80 TABLET ORAL at 20:28

## 2019-02-05 RX ADMIN — ACETAMINOPHEN 650 MG: 325 TABLET ORAL at 12:44

## 2019-02-05 RX ADMIN — LOPERAMIDE HYDROCHLORIDE 2 MG: 2 CAPSULE ORAL at 12:12

## 2019-02-05 RX ADMIN — METFORMIN HYDROCHLORIDE 500 MG: 500 TABLET, EXTENDED RELEASE ORAL at 07:49

## 2019-02-05 ASSESSMENT — PAIN SCALES - GENERAL
PAINLEVEL_OUTOF10: 3
PAINLEVEL_OUTOF10: 5

## 2019-02-06 VITALS
OXYGEN SATURATION: 96 % | DIASTOLIC BLOOD PRESSURE: 61 MMHG | TEMPERATURE: 98.8 F | SYSTOLIC BLOOD PRESSURE: 111 MMHG | BODY MASS INDEX: 27.5 KG/M2 | HEIGHT: 64 IN | RESPIRATION RATE: 17 BRPM | HEART RATE: 80 BPM | WEIGHT: 161.1 LBS

## 2019-02-06 PROCEDURE — 6370000000 HC RX 637 (ALT 250 FOR IP): Performed by: HOSPITALIST

## 2019-02-06 PROCEDURE — 6370000000 HC RX 637 (ALT 250 FOR IP): Performed by: PSYCHIATRY & NEUROLOGY

## 2019-02-06 RX ORDER — DIVALPROEX SODIUM 250 MG/1
750 TABLET, EXTENDED RELEASE ORAL 2 TIMES DAILY
Qty: 30 TABLET | Refills: 0 | Status: SHIPPED | OUTPATIENT
Start: 2019-02-06

## 2019-02-06 RX ORDER — LITHIUM CARBONATE 600 MG/1
600 CAPSULE ORAL NIGHTLY
Qty: 90 CAPSULE | Refills: 0 | Status: SHIPPED | OUTPATIENT
Start: 2019-02-06

## 2019-02-06 RX ADMIN — METFORMIN HYDROCHLORIDE 500 MG: 500 TABLET, EXTENDED RELEASE ORAL at 09:01

## 2019-02-06 RX ADMIN — FLUTICASONE PROPIONATE 1 SPRAY: 50 SPRAY, METERED NASAL at 09:01

## 2019-02-06 RX ADMIN — FAMOTIDINE 20 MG: 20 TABLET ORAL at 09:01

## 2019-02-06 RX ADMIN — DIVALPROEX SODIUM 750 MG: 500 TABLET, EXTENDED RELEASE ORAL at 09:01

## 2019-02-06 RX ADMIN — SIMETHICONE CHEW TAB 80 MG 80 MG: 80 TABLET ORAL at 09:01

## 2019-02-06 RX ADMIN — CETIRIZINE HYDROCHLORIDE 10 MG: 10 TABLET, FILM COATED ORAL at 09:01

## 2024-02-28 ENCOUNTER — NURSING HOME VISIT (OUTPATIENT)
Dept: POST ACUTE CARE | Facility: EXTERNAL LOCATION | Age: 69
End: 2024-02-28
Payer: MEDICARE

## 2024-02-28 DIAGNOSIS — G89.29 OTHER CHRONIC PAIN: ICD-10-CM

## 2024-02-28 DIAGNOSIS — F01.50 VASCULAR DEMENTIA, UNSPECIFIED DEMENTIA SEVERITY, UNSPECIFIED WHETHER BEHAVIORAL, PSYCHOTIC, OR MOOD DISTURBANCE OR ANXIETY (MULTI): ICD-10-CM

## 2024-02-28 DIAGNOSIS — E08.00 DIABETES MELLITUS DUE TO UNDERLYING CONDITION WITH HYPEROSMOLARITY WITHOUT COMA, WITHOUT LONG-TERM CURRENT USE OF INSULIN (MULTI): Primary | ICD-10-CM

## 2024-02-28 PROBLEM — E11.9 DIABETES MELLITUS (MULTI): Status: ACTIVE | Noted: 2024-02-28

## 2024-02-28 PROBLEM — F03.90 DEMENTIA (MULTI): Status: ACTIVE | Noted: 2024-02-28

## 2024-02-28 PROCEDURE — 99310 SBSQ NF CARE HIGH MDM 45: CPT | Performed by: NURSE PRACTITIONER

## 2024-02-28 NOTE — LETTER
Patient: Allison Ryan  : 1955    Encounter Date: 2024    PROGRESS NOTE    SubjectiveChief complaint: Allison Ryan is a 69 y.o. female who was recently admitted to this facility long term care      HPI: Is pleasant and talkative . Has good social skills.  Denies any chest pain or tightness. Reports that she generally has a good appetite but only wanted cold cereal this morning.   Poor historian but reports that she has back pain. Is currently sitting up in a chair with a pillow to support her back.   She has an extensive medical history including  unspecified dementia, bipolar disease and , diabetes mellitus.  HPI      Objective  Vital signs: 155/72-77-18- 97.7    Physical Exam  Vitals and nursing note reviewed.   Constitutional:       General: She is not in acute distress.  Eyes:      Extraocular Movements: Extraocular movements intact.   Cardiovascular:      Rate and Rhythm: Normal rate and regular rhythm.   Pulmonary:      Effort: Pulmonary effort is normal.      Breath sounds: Normal breath sounds.      Comments: Lungs clear   Abdominal:      General: Bowel sounds are normal.      Palpations: Abdomen is soft.   Musculoskeletal:      Cervical back: Neck supple.      Right lower leg: No edema.      Left lower leg: No edema.   Neurological:      Mental Status: She is alert.   Psychiatric:         Mood and Affect: Mood normal.         Behavior: Behavior is cooperative.         Cognition and Memory: Cognition is impaired.      Comments: Is oriented to time and date          Assessment/Plan  Problem List Items Addressed This Visit       Dementia (CMS/[facility])     Has cognitive loss with HX bipolar disease  Requires a secure structured environment for her safety   Poor historian   Has good social skills    Currently requires haldol decanoate IM monthly   Monitor behaviors   Monitor side effects.          Diabetes mellitus (CMS/[facility]) - Primary     Monitor BS 2 x day   Monitor HgA1c    Remains on pioglitazone   Takes  gabapentin - neuropathy          Chronic pain     Reports back pain and shoulder pain   Will add lidocaine patches   Monitor effectiveness    Therapy             Medications, treatments, and labs reviewed  Continue medications and treatments as listed in EMR    RUSS Pizarro      Electronically Signed By: RUSS Pizarro   2/28/24  8:35 PM

## 2024-02-29 NOTE — ASSESSMENT & PLAN NOTE
Has cognitive loss with HX bipolar disease  Requires a secure structured environment for her safety   Poor historian   Has good social skills    Currently requires haldol decanoate IM monthly   Monitor behaviors   Monitor side effects.

## 2024-02-29 NOTE — PROGRESS NOTES
PROGRESS NOTE    Subjective Chief complaint: Allison Ryan is a 69 y.o. female who was recently admitted to this facility long term care      HPI: Is pleasant and talkative . Has good social skills.  Denies any chest pain or tightness. Reports that she generally has a good appetite but only wanted cold cereal this morning.   Poor historian but reports that she has back pain. Is currently sitting up in a chair with a pillow to support her back.   She has an extensive medical history including  unspecified dementia, bipolar disease and , diabetes mellitus.  HPI      Objective   Vital signs: 155/72-77-18- 97.7    Physical Exam  Vitals and nursing note reviewed.   Constitutional:       General: She is not in acute distress.  Eyes:      Extraocular Movements: Extraocular movements intact.   Cardiovascular:      Rate and Rhythm: Normal rate and regular rhythm.   Pulmonary:      Effort: Pulmonary effort is normal.      Breath sounds: Normal breath sounds.      Comments: Lungs clear   Abdominal:      General: Bowel sounds are normal.      Palpations: Abdomen is soft.   Musculoskeletal:      Cervical back: Neck supple.      Right lower leg: No edema.      Left lower leg: No edema.   Neurological:      Mental Status: She is alert.   Psychiatric:         Mood and Affect: Mood normal.         Behavior: Behavior is cooperative.         Cognition and Memory: Cognition is impaired.      Comments: Is oriented to time and date          Assessment/Plan   Problem List Items Addressed This Visit       Dementia (CMS/[facility])     Has cognitive loss with HX bipolar disease  Requires a secure structured environment for her safety   Poor historian   Has good social skills    Currently requires haldol decanoate IM monthly   Monitor behaviors   Monitor side effects.          Diabetes mellitus (CMS/[facility]) - Primary     Monitor BS 2 x day   Monitor HgA1c    Remains on pioglitazone   Takes gabapentin - neuropathy          Chronic pain     Reports back  pain and shoulder pain   Will add lidocaine patches   Monitor effectiveness    Therapy             Medications, treatments, and labs reviewed  Continue medications and treatments as listed in EMR    Sylvie Ann, APRN-CNP

## 2024-02-29 NOTE — ASSESSMENT & PLAN NOTE
Reports back pain and shoulder pain   Will add lidocaine patches   Monitor effectiveness    Therapy

## 2024-03-04 ENCOUNTER — NURSING HOME VISIT (OUTPATIENT)
Dept: POST ACUTE CARE | Facility: EXTERNAL LOCATION | Age: 69
End: 2024-03-04
Payer: MEDICARE

## 2024-03-04 DIAGNOSIS — F41.9 ANXIETY: Primary | ICD-10-CM

## 2024-03-04 DIAGNOSIS — G30.0 EARLY ONSET ALZHEIMER'S DEMENTIA WITH MOOD DISTURBANCE, UNSPECIFIED DEMENTIA SEVERITY (MULTI): ICD-10-CM

## 2024-03-04 DIAGNOSIS — E11.9 TYPE 2 DIABETES MELLITUS WITHOUT COMPLICATION, WITHOUT LONG-TERM CURRENT USE OF INSULIN (MULTI): ICD-10-CM

## 2024-03-04 DIAGNOSIS — F02.83 EARLY ONSET ALZHEIMER'S DEMENTIA WITH MOOD DISTURBANCE, UNSPECIFIED DEMENTIA SEVERITY (MULTI): ICD-10-CM

## 2024-03-04 PROCEDURE — 99309 SBSQ NF CARE MODERATE MDM 30: CPT | Performed by: NURSE PRACTITIONER

## 2024-03-04 NOTE — LETTER
"Patient: Allison Ryan  : 1955    Encounter Date: 2024    PROGRESS NOTE    Subjective  Chief complaint: Allison Ryan is a 69 y.o. female who is a long term care patient being seen and evaluated for anxiety     HPI:  Nursing reports that she is having increased episodes of anxiety. Found her on the unit. Is verbally upset and restless. She sat down with me and reported that she feels like her \"body is shaking inside\".  She reports that she is aware of her anxiety.   She has good insight and reports that ativan has helped her in the past.   She indicated that she is trying calm herself with deep breathing exercises.   She is receptive to further support. The clinic psychologist is here- notified her that she was in need.     HPI      Objective  Vital signs: 132/73-72-18-97.3     Physical Exam  Constitutional:       General: She is not in acute distress.  Eyes:      Extraocular Movements: Extraocular movements intact.   Pulmonary:      Effort: Pulmonary effort is normal.   Musculoskeletal:      Cervical back: Neck supple.   Neurological:      Mental Status: She is alert.   Psychiatric:         Mood and Affect: Mood is anxious.         Behavior: Behavior is cooperative.      Comments: Anxious - has good insight          Assessment/Plan  Problem List Items Addressed This Visit       Dementia (CMS/[facility])     Has cognitive loss with HX bipolar disease  Requires a secure structured environment for her safety   Poor historian   Has good social skills    Currently requires haldol decanoate IM monthly   Monitor behaviors   Monitor side effects.          Diabetes mellitus (CMS/[facility])     Monitor BS 2 x day   Monitor HgA1c    Remains on pioglitazone   Takes gabapentin - neuropathy          Anxiety - Primary     Reports that she has had a long history of anxiety   She indicated that ativan has helped in the past   Is open to meeting with psychologist-     Psychologist - available in the facility- notified of need to see "   Advised deep breathing exercises   Adult coloring books   Music   Will add ativan prn - monitor                Medications, treatments, and labs reviewed  Continue medications and treatments as listed in EMR    RUSS Pizarro      Electronically Signed By: RUSS Pizarro   3/5/24  6:57 PM

## 2024-03-05 PROBLEM — F41.9 ANXIETY: Status: ACTIVE | Noted: 2024-03-05

## 2024-03-05 NOTE — ASSESSMENT & PLAN NOTE
Reports that she has had a long history of anxiety   She indicated that ativan has helped in the past   Is open to meeting with psychologist-     Psychologist - available in the facility- notified of need to see   Advised deep breathing exercises   Adult coloring books   Music   Will add ativan prn - monitor

## 2024-03-05 NOTE — PROGRESS NOTES
"PROGRESS NOTE    Subjective   Chief complaint: Allison Ryan is a 69 y.o. female who is a long term care patient being seen and evaluated for anxiety     HPI:  Nursing reports that she is having increased episodes of anxiety. Found her on the unit. Is verbally upset and restless. She sat down with me and reported that she feels like her \"body is shaking inside\".  She reports that she is aware of her anxiety.   She has good insight and reports that ativan has helped her in the past.   She indicated that she is trying calm herself with deep breathing exercises.   She is receptive to further support. The clinic psychologist is here- notified her that she was in need.     HPI      Objective   Vital signs: 132/73-72-18-97.3     Physical Exam  Constitutional:       General: She is not in acute distress.  Eyes:      Extraocular Movements: Extraocular movements intact.   Pulmonary:      Effort: Pulmonary effort is normal.   Musculoskeletal:      Cervical back: Neck supple.   Neurological:      Mental Status: She is alert.   Psychiatric:         Mood and Affect: Mood is anxious.         Behavior: Behavior is cooperative.      Comments: Anxious - has good insight          Assessment/Plan   Problem List Items Addressed This Visit       Dementia (CMS/[facility])     Has cognitive loss with HX bipolar disease  Requires a secure structured environment for her safety   Poor historian   Has good social skills    Currently requires haldol decanoate IM monthly   Monitor behaviors   Monitor side effects.          Diabetes mellitus (CMS/[facility])     Monitor BS 2 x day   Monitor HgA1c    Remains on pioglitazone   Takes gabapentin - neuropathy          Anxiety - Primary     Reports that she has had a long history of anxiety   She indicated that ativan has helped in the past   Is open to meeting with psychologist-     Psychologist - available in the facility- notified of need to see   Advised deep breathing exercises   Adult coloring books   Music "   Will add ativan prn - monitor                Medications, treatments, and labs reviewed  Continue medications and treatments as listed in EMR    Sylvie Ann, APRN-CNP

## 2024-03-06 ENCOUNTER — NURSING HOME VISIT (OUTPATIENT)
Dept: POST ACUTE CARE | Facility: EXTERNAL LOCATION | Age: 69
End: 2024-03-06
Payer: MEDICARE

## 2024-03-06 DIAGNOSIS — F02.83 EARLY ONSET ALZHEIMER'S DEMENTIA WITH MOOD DISTURBANCE, UNSPECIFIED DEMENTIA SEVERITY (MULTI): Primary | ICD-10-CM

## 2024-03-06 DIAGNOSIS — E11.9 TYPE 2 DIABETES MELLITUS WITHOUT COMPLICATION, WITHOUT LONG-TERM CURRENT USE OF INSULIN (MULTI): ICD-10-CM

## 2024-03-06 DIAGNOSIS — G30.0 EARLY ONSET ALZHEIMER'S DEMENTIA WITH MOOD DISTURBANCE, UNSPECIFIED DEMENTIA SEVERITY (MULTI): Primary | ICD-10-CM

## 2024-03-06 DIAGNOSIS — F41.9 ANXIETY: ICD-10-CM

## 2024-03-06 PROCEDURE — 99309 SBSQ NF CARE MODERATE MDM 30: CPT | Performed by: NURSE PRACTITIONER

## 2024-03-06 NOTE — LETTER
Patient: Allison Ryan  : 1955    Encounter Date: 2024    PROGRESS NOTE    Subjective  Chief complaint: Allison Ryan is a 69 y.o. female who is a long term care patient being seen and evaluated for poor sleep habits     HPI: Reports poor sleep and requested trazodone. She reports that she has used trazodone in the past.   Anxiety has improved. Has been transferred to another room.   She reports that she did meet with counselor.   Nursing reports that her appetite is fair.   Encourage her to focus on getting rest and good diet.     HPI      Objective  Vital signs: 116/73-72-18-97.4     Physical Exam  Vitals and nursing note reviewed.   Constitutional:       General: She is not in acute distress.  Eyes:      Extraocular Movements: Extraocular movements intact.   Cardiovascular:      Rate and Rhythm: Normal rate and regular rhythm.   Pulmonary:      Effort: Pulmonary effort is normal.      Breath sounds: Normal breath sounds.   Abdominal:      General: Bowel sounds are normal.      Palpations: Abdomen is soft.   Musculoskeletal:      Cervical back: Neck supple.      Right lower leg: No edema.      Left lower leg: No edema.   Neurological:      Mental Status: She is alert.   Psychiatric:         Mood and Affect: Mood is anxious.         Behavior: Behavior is cooperative.      Comments: Reports poor sleeping habits          Assessment/Plan  Problem List Items Addressed This Visit       Dementia (CMS/[facility]) - Primary     Has cognitive loss with HX bipolar disease  Requires a secure structured environment for her safety   Poor historian   Has good social skills    Currently requires haldol decanoate IM monthly   Monitor behaviors   Monitor side effects.          Diabetes mellitus (CMS/[facility])     Monitor BS 2 x day   Monitor HgA1c    Remains on pioglitazone   Takes gabapentin - neuropathy          Anxiety     Reports that she has had a long history of anxiety   She indicated that ativan has helped in the past   Is open  to meeting with psychologist-     Psychologist - available in the facility- notified of need to see   Advised deep breathing exercises   Adult coloring books   Music   Will add ativan prn - monitor   Reports poor sleep - requested trazodone   Trazodone added   Will monitor                Medications, treatments, and labs reviewed  Continue medications and treatments as listed in EMR    RUSS Pizarro      Electronically Signed By: RUSS Pizarro   3/10/24 11:40 AM

## 2024-03-10 NOTE — PROGRESS NOTES
PROGRESS NOTE    Subjective   Chief complaint: Allison Ryan is a 69 y.o. female who is a long term care patient being seen and evaluated for poor sleep habits     HPI: Reports poor sleep and requested trazodone. She reports that she has used trazodone in the past.   Anxiety has improved. Has been transferred to another room.   She reports that she did meet with counselor.   Nursing reports that her appetite is fair.   Encourage her to focus on getting rest and good diet.     HPI      Objective   Vital signs: 116/73-72-18-97.4     Physical Exam  Vitals and nursing note reviewed.   Constitutional:       General: She is not in acute distress.  Eyes:      Extraocular Movements: Extraocular movements intact.   Cardiovascular:      Rate and Rhythm: Normal rate and regular rhythm.   Pulmonary:      Effort: Pulmonary effort is normal.      Breath sounds: Normal breath sounds.   Abdominal:      General: Bowel sounds are normal.      Palpations: Abdomen is soft.   Musculoskeletal:      Cervical back: Neck supple.      Right lower leg: No edema.      Left lower leg: No edema.   Neurological:      Mental Status: She is alert.   Psychiatric:         Mood and Affect: Mood is anxious.         Behavior: Behavior is cooperative.      Comments: Reports poor sleeping habits          Assessment/Plan   Problem List Items Addressed This Visit       Dementia (CMS/[facility]) - Primary     Has cognitive loss with HX bipolar disease  Requires a secure structured environment for her safety   Poor historian   Has good social skills    Currently requires haldol decanoate IM monthly   Monitor behaviors   Monitor side effects.          Diabetes mellitus (CMS/[facility])     Monitor BS 2 x day   Monitor HgA1c    Remains on pioglitazone   Takes gabapentin - neuropathy          Anxiety     Reports that she has had a long history of anxiety   She indicated that ativan has helped in the past   Is open to meeting with psychologist-     Psychologist - available in the  facility- notified of need to see   Advised deep breathing exercises   Adult coloring books   Music   Will add ativan prn - monitor   Reports poor sleep - requested trazodone   Trazodone added   Will monitor                Medications, treatments, and labs reviewed  Continue medications and treatments as listed in EMR    Sylvie Ann, APRN-CNP

## 2024-03-10 NOTE — ASSESSMENT & PLAN NOTE
Reports that she has had a long history of anxiety   She indicated that ativan has helped in the past   Is open to meeting with psychologist-     Psychologist - available in the facility- notified of need to see   Advised deep breathing exercises   Adult coloring books   Music   Will add ativan prn - monitor   Reports poor sleep - requested trazodone   Trazodone added   Will monitor

## 2024-03-18 ENCOUNTER — NURSING HOME VISIT (OUTPATIENT)
Dept: POST ACUTE CARE | Facility: EXTERNAL LOCATION | Age: 69
End: 2024-03-18
Payer: MEDICARE

## 2024-03-18 DIAGNOSIS — F02.83 EARLY ONSET ALZHEIMER'S DEMENTIA WITH MOOD DISTURBANCE, UNSPECIFIED DEMENTIA SEVERITY (MULTI): ICD-10-CM

## 2024-03-18 DIAGNOSIS — G30.0 EARLY ONSET ALZHEIMER'S DEMENTIA WITH MOOD DISTURBANCE, UNSPECIFIED DEMENTIA SEVERITY (MULTI): ICD-10-CM

## 2024-03-18 DIAGNOSIS — F31.9 BIPOLAR AFFECTIVE DISORDER, REMISSION STATUS UNSPECIFIED (MULTI): Primary | ICD-10-CM

## 2024-03-18 DIAGNOSIS — E11.69 TYPE 2 DIABETES MELLITUS WITH OTHER SPECIFIED COMPLICATION, UNSPECIFIED WHETHER LONG TERM INSULIN USE (MULTI): ICD-10-CM

## 2024-03-18 DIAGNOSIS — F32.A DEPRESSION, UNSPECIFIED DEPRESSION TYPE: ICD-10-CM

## 2024-03-18 DIAGNOSIS — G47.00 INSOMNIA, UNSPECIFIED TYPE: ICD-10-CM

## 2024-03-18 DIAGNOSIS — F41.9 ANXIETY: ICD-10-CM

## 2024-03-18 PROCEDURE — 99305 1ST NF CARE MODERATE MDM 35: CPT | Performed by: INTERNAL MEDICINE

## 2024-03-18 NOTE — LETTER
Patient: Allison Ryan  : 1955    Encounter Date: 2024    HISTORY & PHYSICAL    Subjective  Chief complaint: Allison Ryan is a 69 y.o. female who is being seen and evaluated for multiple medical problems.  Patient presents for admission to nursing facility.    HPI:  HPI  An interactive audio and/or video telecommunication system which permits real time communications between the patient (at the originating site) and provider (at a distant site) was utilized to provide this telehealth service after obtaining verbal consent.  Patient presents for admission to nursing facility.  Patient had a prior hospitalization for agitation and aggressive behavior.  Patient has a past medical history that is significant for anxiety, borderline personality disorder, bipolar disorder, diabetes.  Patient transferred to nursing facility for long-term care and continued medical management.  Patient was seen and examined at bedside, appears to be in no acute distress.  Denies chest pain or shortness of breath.  Denies nausea or vomiting.  Nursing reporting no new concerns at this time.    Past Medical History:   Diagnosis Date   • Dementia (CMS/[facility])    • Depression    • Insomnia    • Type 2 diabetes mellitus (CMS/[facility])        No past surgical history on file.    Family History   Problem Relation Name Age of Onset   • No Known Problems Mother     • No Known Problems Father         Social History     Socioeconomic History   • Marital status: Not on file     Spouse name: Not on file   • Number of children: Not on file   • Years of education: Not on file   • Highest education level: Not on file   Occupational History   • Not on file   Tobacco Use   • Smoking status: Not on file   • Smokeless tobacco: Not on file   Substance and Sexual Activity   • Alcohol use: Not on file   • Drug use: Not on file   • Sexual activity: Not on file   Other Topics Concern   • Not on file   Social History Narrative   • Not on file     Social Determinants  of Health     Financial Resource Strain: Not on file   Food Insecurity: Not on file   Transportation Needs: Not on file   Physical Activity: Not on file   Stress: Not on file   Social Connections: Not on file   Intimate Partner Violence: Not on file   Housing Stability: Not on file       Vital signs: 133/83, 97.4, 85, 16, blood sugar 171, 94%    Objective  Physical Exam  Constitutional:       General: She is not in acute distress.  Eyes:      Extraocular Movements: Extraocular movements intact.   Pulmonary:      Effort: Pulmonary effort is normal.   Musculoskeletal:      Cervical back: Neck supple.   Neurological:      Mental Status: She is alert.   Psychiatric:         Mood and Affect: Mood normal.         Behavior: Behavior is cooperative.         Assessment/Plan  Problem List Items Addressed This Visit       Dementia (CMS/[facility])     Assist with care as needed  Monitor for safety           Anxiety     Lorazepam  To f/u with psych         Depression     Monitor mood and behaviors.  Trazodone         Insomnia     Melatonin  Monitor sleep patterns         Type 2 diabetes mellitus (CMS/[facility])     Glucoscans  Pioglitazone  Monitor A1c         Bipolar disorder (CMS/[facility]) - Primary     Haloperidol monthly  Monitor mood and behaviors          Hospital records reviewed  Medications, treatments, and labs reviewed  Continue medications and treatments as listed in EMR  Discussed with nursing and therapy      Scribe Attestation  I, Lorena Bañuelos Scribe   attest that this documentation has been prepared under the direction and in the presence of Macy Winters MD    Provider Attestation - Scribe documentation  All medical record entries made by the Scribe were at my direction and personally dictated by me. I have reviewed the chart and agree that the record accurately reflects my personal performance of the history, physical exam, discussion and plan.   Macy Winters MD      Electronically Signed By: Macy Winters MD    3/19/24  5:47 PM

## 2024-03-19 PROBLEM — E11.9 TYPE 2 DIABETES MELLITUS (MULTI): Status: ACTIVE | Noted: 2024-03-19

## 2024-03-19 PROBLEM — G47.00 INSOMNIA: Status: ACTIVE | Noted: 2024-03-19

## 2024-03-19 PROBLEM — F32.A DEPRESSION: Status: ACTIVE | Noted: 2024-03-19

## 2024-03-19 PROBLEM — F31.9 BIPOLAR DISORDER (MULTI): Status: ACTIVE | Noted: 2024-03-19

## 2024-03-19 NOTE — PROGRESS NOTES
HISTORY & PHYSICAL    Subjective   Chief complaint: Allison Ryan is a 69 y.o. female who is being seen and evaluated for multiple medical problems.  Patient presents for admission to nursing facility.    HPI:  HPI  An interactive audio and/or video telecommunication system which permits real time communications between the patient (at the originating site) and provider (at a distant site) was utilized to provide this telehealth service after obtaining verbal consent.  Patient presents for admission to nursing facility.  Patient had a prior hospitalization for agitation and aggressive behavior.  Patient has a past medical history that is significant for anxiety, borderline personality disorder, bipolar disorder, diabetes.  Patient transferred to nursing facility for long-term care and continued medical management.  Patient was seen and examined at bedside, appears to be in no acute distress.  Denies chest pain or shortness of breath.  Denies nausea or vomiting.  Nursing reporting no new concerns at this time.    Past Medical History:   Diagnosis Date    Dementia (CMS/[facility])     Depression     Insomnia     Type 2 diabetes mellitus (CMS/[facility])        No past surgical history on file.    Family History   Problem Relation Name Age of Onset    No Known Problems Mother      No Known Problems Father         Social History     Socioeconomic History    Marital status: Not on file     Spouse name: Not on file    Number of children: Not on file    Years of education: Not on file    Highest education level: Not on file   Occupational History    Not on file   Tobacco Use    Smoking status: Not on file    Smokeless tobacco: Not on file   Substance and Sexual Activity    Alcohol use: Not on file    Drug use: Not on file    Sexual activity: Not on file   Other Topics Concern    Not on file   Social History Narrative    Not on file     Social Determinants of Health     Financial Resource Strain: Not on file   Food Insecurity: Not on file    Transportation Needs: Not on file   Physical Activity: Not on file   Stress: Not on file   Social Connections: Not on file   Intimate Partner Violence: Not on file   Housing Stability: Not on file       Vital signs: 133/83, 97.4, 85, 16, blood sugar 171, 94%    Objective   Physical Exam  Constitutional:       General: She is not in acute distress.  Eyes:      Extraocular Movements: Extraocular movements intact.   Pulmonary:      Effort: Pulmonary effort is normal.   Musculoskeletal:      Cervical back: Neck supple.   Neurological:      Mental Status: She is alert.   Psychiatric:         Mood and Affect: Mood normal.         Behavior: Behavior is cooperative.         Assessment/Plan   Problem List Items Addressed This Visit       Dementia (CMS/[facility])     Assist with care as needed  Monitor for safety           Anxiety     Lorazepam  To f/u with psych         Depression     Monitor mood and behaviors.  Trazodone         Insomnia     Melatonin  Monitor sleep patterns         Type 2 diabetes mellitus (CMS/[facility])     Glucoscans  Pioglitazone  Monitor A1c         Bipolar disorder (CMS/[facility]) - Primary     Haloperidol monthly  Monitor mood and behaviors          Hospital records reviewed  Medications, treatments, and labs reviewed  Continue medications and treatments as listed in EMR  Discussed with nursing and therapy      Scribe Attestation  I, Arthur Mcmanus   attest that this documentation has been prepared under the direction and in the presence of Macy Winters MD    Provider Attestation - Scribe documentation  All medical record entries made by the Scribe were at my direction and personally dictated by me. I have reviewed the chart and agree that the record accurately reflects my personal performance of the history, physical exam, discussion and plan.   Macy Winters MD

## 2024-04-03 ENCOUNTER — NURSING HOME VISIT (OUTPATIENT)
Dept: POST ACUTE CARE | Facility: EXTERNAL LOCATION | Age: 69
End: 2024-04-03
Payer: MEDICARE

## 2024-04-03 DIAGNOSIS — G47.00 INSOMNIA, UNSPECIFIED TYPE: ICD-10-CM

## 2024-04-03 DIAGNOSIS — F32.A DEPRESSION, UNSPECIFIED DEPRESSION TYPE: ICD-10-CM

## 2024-04-03 DIAGNOSIS — F31.9 BIPOLAR AFFECTIVE DISORDER, REMISSION STATUS UNSPECIFIED (MULTI): ICD-10-CM

## 2024-04-03 DIAGNOSIS — F41.9 ANXIETY: ICD-10-CM

## 2024-04-03 DIAGNOSIS — E11.69 TYPE 2 DIABETES MELLITUS WITH OTHER SPECIFIED COMPLICATION, UNSPECIFIED WHETHER LONG TERM INSULIN USE (MULTI): Primary | ICD-10-CM

## 2024-04-03 PROCEDURE — 99308 SBSQ NF CARE LOW MDM 20: CPT | Performed by: NURSE PRACTITIONER

## 2024-04-03 NOTE — LETTER
Patient: Allison Ryan  : 1955    Encounter Date: 2024    PROGRESS NOTE    Subjective  Chief complaint: Allison Ryan is a 69 y.o. female who is a long term care patient being seen and evaluated for medical care needs.     HPI: Nursing reports that she generally comes out for meals then frequently returns to bed.   Nursing reports that her intake is inconsistent.   She is in bed, awake. Reports that she remains anxious. She reports that she doesn't remember working or visiting with the clinical psychologist.  Denies any chest pain or tightness. Denies any  gastric distress.   Reports that her sleep is interrupted with her roommate playing her TV at night.   The medical CNP here - informed of reported anxiety. Is scheduled to visit with her today.   Nursing notified of reports of sleep being interrupted by her roommate watching TV.     HPI      Objective  Vital signs: 134/74-66-18-97.6  Current weight -146.2     Physical Exam  Vitals and nursing note reviewed.   Constitutional:       General: She is not in acute distress.  Eyes:      Extraocular Movements: Extraocular movements intact.   Cardiovascular:      Rate and Rhythm: Normal rate and regular rhythm.   Pulmonary:      Effort: Pulmonary effort is normal.      Breath sounds: Normal breath sounds.   Abdominal:      General: Bowel sounds are normal.      Palpations: Abdomen is soft.   Musculoskeletal:      Cervical back: Neck supple.      Right lower leg: No edema.      Left lower leg: No edema.   Neurological:      Mental Status: She is alert.   Psychiatric:         Mood and Affect: Mood is anxious.         Behavior: Behavior is cooperative.      Comments: Reports poor sleeping habits          Assessment/Plan  Problem List Items Addressed This Visit       Anxiety     Lorazepam    Mental health follows          Depression     Monitor mood and behaviors.  Trazodone         Insomnia     Melatonin  Monitor sleep patterns  Reports that her roommate - TV bothers  her sleep- nursing notified          Type 2 diabetes mellitus (CMS/[facility]) - Primary     Glucoscans  Pioglitazone  Monitor A1c and BS         Bipolar disorder (CMS/[facility])     Haloperidol monthly  Monitor mood and behaviors  Mental health follows           Medications, treatments, and labs reviewed  Continue medications and treatments as listed in EMR    KATRINA Pizarro-SUSHANT      Electronically Signed By: RUSS Pizarro   4/7/24  9:36 AM

## 2024-04-07 PROBLEM — E11.9 DIABETES MELLITUS (MULTI): Status: RESOLVED | Noted: 2024-02-28 | Resolved: 2024-04-07

## 2024-04-07 NOTE — ASSESSMENT & PLAN NOTE
Monitor mood and behaviors.  Trazodone   Patient here today for nurse blood pressure check.  Last dose of BP medication taken about 0700 this am.  Losartan 100 mg 1 QD  Hydrochlorothiazide 100 mg 1 QD  Metoprolol tartrate 50 mg 1 QD  Reports zero caffeine this am  Home BP's 128/84  Reports zero Bp symptoms today  BP Readings from Last 1 Encounters:   02/28/24 0816 132/70       Blood Pressure normal.    Patient to follow up as recommended by PCP.    Pulse Readings from Last 1 Encounters:   02/28/24 69

## 2024-04-07 NOTE — ASSESSMENT & PLAN NOTE
Melatonin  Monitor sleep patterns  Reports that her roommate - TV bothers her sleep- nursing notified

## 2024-04-07 NOTE — PROGRESS NOTES
PROGRESS NOTE    Subjective   Chief complaint: Allison Ryan is a 69 y.o. female who is a long term care patient being seen and evaluated for medical care needs.     HPI: Nursing reports that she generally comes out for meals then frequently returns to bed.   Nursing reports that her intake is inconsistent.   She is in bed, awake. Reports that she remains anxious. She reports that she doesn't remember working or visiting with the clinical psychologist.  Denies any chest pain or tightness. Denies any  gastric distress.   Reports that her sleep is interrupted with her roommate playing her TV at night.   The medical CNP here - informed of reported anxiety. Is scheduled to visit with her today.   Nursing notified of reports of sleep being interrupted by her roommate watching TV.     HPI      Objective   Vital signs: 134/74-66-18-97.6  Current weight -146.2     Physical Exam  Vitals and nursing note reviewed.   Constitutional:       General: She is not in acute distress.  Eyes:      Extraocular Movements: Extraocular movements intact.   Cardiovascular:      Rate and Rhythm: Normal rate and regular rhythm.   Pulmonary:      Effort: Pulmonary effort is normal.      Breath sounds: Normal breath sounds.   Abdominal:      General: Bowel sounds are normal.      Palpations: Abdomen is soft.   Musculoskeletal:      Cervical back: Neck supple.      Right lower leg: No edema.      Left lower leg: No edema.   Neurological:      Mental Status: She is alert.   Psychiatric:         Mood and Affect: Mood is anxious.         Behavior: Behavior is cooperative.      Comments: Reports poor sleeping habits          Assessment/Plan   Problem List Items Addressed This Visit       Anxiety     Lorazepam    Mental health follows          Depression     Monitor mood and behaviors.  Trazodone         Insomnia     Melatonin  Monitor sleep patterns  Reports that her roommate - TV bothers her sleep- nursing notified          Type 2 diabetes mellitus  (CMS/[facility]) - Primary     Glucoscans  Pioglitazone  Monitor A1c and BS         Bipolar disorder (CMS/[facility])     Haloperidol monthly  Monitor mood and behaviors  Mental health follows           Medications, treatments, and labs reviewed  Continue medications and treatments as listed in EMR    KATRINA Pizarro-CNP

## 2024-04-17 ENCOUNTER — NURSING HOME VISIT (OUTPATIENT)
Dept: POST ACUTE CARE | Facility: EXTERNAL LOCATION | Age: 69
End: 2024-04-17
Payer: MEDICARE

## 2024-04-17 DIAGNOSIS — E11.69 TYPE 2 DIABETES MELLITUS WITH OTHER SPECIFIED COMPLICATION, UNSPECIFIED WHETHER LONG TERM INSULIN USE (MULTI): Primary | ICD-10-CM

## 2024-04-17 DIAGNOSIS — F02.83 EARLY ONSET ALZHEIMER'S DEMENTIA WITH MOOD DISTURBANCE, UNSPECIFIED DEMENTIA SEVERITY (MULTI): ICD-10-CM

## 2024-04-17 DIAGNOSIS — G30.0 EARLY ONSET ALZHEIMER'S DEMENTIA WITH MOOD DISTURBANCE, UNSPECIFIED DEMENTIA SEVERITY (MULTI): ICD-10-CM

## 2024-04-17 DIAGNOSIS — F32.A DEPRESSION, UNSPECIFIED DEPRESSION TYPE: ICD-10-CM

## 2024-04-17 PROCEDURE — 99309 SBSQ NF CARE MODERATE MDM 30: CPT | Performed by: NURSE PRACTITIONER

## 2024-04-17 NOTE — LETTER
Patient: Allison Ryan  : 1955    Encounter Date: 2024    PROGRESS NOTE    Subjective  Chief complaint: Allison Ryan is a 69 y.o. female who is a long term care patient being seen and evaluated for hyperglycemia     HPI: Reviewed blood sugars over past 2 weeks. BS levels ranging 176-320. Nursing reports her intake is consistent. Will add metformin. She is in bed. Actively responding. Denies any discomfort.   Nursing reports her habit is to comes out for meals then return to her bed.  Mental health follows.   HPI      Objective  Vital signs: 134/72-77-18-97.2     Physical Exam  Vitals and nursing note reviewed.   Constitutional:       General: She is not in acute distress.     Comments: Actively responding   Napping in bed    HENT:      Mouth/Throat:      Mouth: Mucous membranes are moist.      Pharynx: Oropharynx is clear.   Eyes:      Extraocular Movements: Extraocular movements intact.   Cardiovascular:      Rate and Rhythm: Normal rate and regular rhythm.      Pulses: Normal pulses.      Heart sounds: Normal heart sounds.   Pulmonary:      Effort: Pulmonary effort is normal.      Breath sounds: Normal breath sounds.      Comments: Lungs clear   Abdominal:      General: Abdomen is flat. Bowel sounds are normal.      Palpations: Abdomen is soft.   Musculoskeletal:      Cervical back: Neck supple.      Right lower leg: No edema.      Left lower leg: No edema.   Skin:     General: Skin is warm.   Neurological:      General: No focal deficit present.      Mental Status: She is alert. Mental status is at baseline.   Psychiatric:         Mood and Affect: Mood normal.         Behavior: Behavior normal. Behavior is cooperative.         Cognition and Memory: Cognition is impaired.         Assessment/Plan  Problem List Items Addressed This Visit       Dementia (Multi)     Has cognitive loss with HX bipolar disease  Requires a secure structured environment for her safety   Poor historian   Has good social skills     Currently requires haldol decanoate IM monthly   Monitor behaviors   Monitor side effects.          Depression     Monitor mood and behaviors.  Trazodone         Type 2 diabetes mellitus (Multi) - Primary     Glucoscans  Pioglitazone  Monitor A1c and BS  BS ranging 176-320   Will add metformin  Dietitian to evaluate              Medications, treatments, and labs reviewed  Continue medications and treatments as listed in EMR    RUSS Pizarro      Electronically Signed By: RUSS Pizarro   4/20/24  2:46 PM

## 2024-04-20 NOTE — ASSESSMENT & PLAN NOTE
Glucoscans  Pioglitazone  Monitor A1c and BS  BS ranging 176-320   Will add metformin  Dietitian to evaluate

## 2024-04-20 NOTE — PROGRESS NOTES
PROGRESS NOTE    Subjective   Chief complaint: Allison Ryan is a 69 y.o. female who is a long term care patient being seen and evaluated for hyperglycemia     HPI: Reviewed blood sugars over past 2 weeks. BS levels ranging 176-320. Nursing reports her intake is consistent. Will add metformin. She is in bed. Actively responding. Denies any discomfort.   Nursing reports her habit is to comes out for meals then return to her bed.  Mental health follows.   HPI      Objective   Vital signs: 134/72-77-18-97.2     Physical Exam  Vitals and nursing note reviewed.   Constitutional:       General: She is not in acute distress.     Comments: Actively responding   Napping in bed    HENT:      Mouth/Throat:      Mouth: Mucous membranes are moist.      Pharynx: Oropharynx is clear.   Eyes:      Extraocular Movements: Extraocular movements intact.   Cardiovascular:      Rate and Rhythm: Normal rate and regular rhythm.      Pulses: Normal pulses.      Heart sounds: Normal heart sounds.   Pulmonary:      Effort: Pulmonary effort is normal.      Breath sounds: Normal breath sounds.      Comments: Lungs clear   Abdominal:      General: Abdomen is flat. Bowel sounds are normal.      Palpations: Abdomen is soft.   Musculoskeletal:      Cervical back: Neck supple.      Right lower leg: No edema.      Left lower leg: No edema.   Skin:     General: Skin is warm.   Neurological:      General: No focal deficit present.      Mental Status: She is alert. Mental status is at baseline.   Psychiatric:         Mood and Affect: Mood normal.         Behavior: Behavior normal. Behavior is cooperative.         Cognition and Memory: Cognition is impaired.         Assessment/Plan   Problem List Items Addressed This Visit       Dementia (Multi)     Has cognitive loss with HX bipolar disease  Requires a secure structured environment for her safety   Poor historian   Has good social skills    Currently requires haldol decanoate IM monthly   Monitor  behaviors   Monitor side effects.          Depression     Monitor mood and behaviors.  Trazodone         Type 2 diabetes mellitus (Multi) - Primary     Glucoscans  Pioglitazone  Monitor A1c and BS  BS ranging 176-320   Will add metformin  Dietitian to evaluate              Medications, treatments, and labs reviewed  Continue medications and treatments as listed in EMR    Sylvie Ann, APRN-CNP

## 2024-04-29 ENCOUNTER — NURSING HOME VISIT (OUTPATIENT)
Dept: POST ACUTE CARE | Facility: EXTERNAL LOCATION | Age: 69
End: 2024-04-29
Payer: MEDICARE

## 2024-04-29 DIAGNOSIS — E11.69 TYPE 2 DIABETES MELLITUS WITH OTHER SPECIFIED COMPLICATION, UNSPECIFIED WHETHER LONG TERM INSULIN USE (MULTI): ICD-10-CM

## 2024-04-29 DIAGNOSIS — G47.00 INSOMNIA, UNSPECIFIED TYPE: ICD-10-CM

## 2024-04-29 DIAGNOSIS — F41.9 ANXIETY: ICD-10-CM

## 2024-04-29 DIAGNOSIS — G30.0 EARLY ONSET ALZHEIMER'S DEMENTIA WITH MOOD DISTURBANCE, UNSPECIFIED DEMENTIA SEVERITY (MULTI): Primary | ICD-10-CM

## 2024-04-29 DIAGNOSIS — F02.83 EARLY ONSET ALZHEIMER'S DEMENTIA WITH MOOD DISTURBANCE, UNSPECIFIED DEMENTIA SEVERITY (MULTI): Primary | ICD-10-CM

## 2024-04-29 DIAGNOSIS — F31.9 BIPOLAR AFFECTIVE DISORDER, REMISSION STATUS UNSPECIFIED (MULTI): ICD-10-CM

## 2024-04-29 PROCEDURE — 99308 SBSQ NF CARE LOW MDM 20: CPT | Performed by: NURSE PRACTITIONER

## 2024-04-29 NOTE — LETTER
Patient: Allison Ryan  : 1955    Encounter Date: 2024    PROGRESS NOTE    Subjective  Chief complaint: Allison Ryan is a 69 y.o. female who is a long term care patient being seen and evaluated for poor sleep.     HPI: is sitting at the dining room table. Is visiting with the clinical psychologist. She appears comfortable, no anxiety evident. She reports that she is sleeping a bit better.   Is prepared for lunch. Has good appetite when served. Denies any complaints.   HPI      Objective  Vital signs: 127/68-82-18-97.7     Physical Exam  Constitutional:       General: She is not in acute distress.     Comments: Social and talkative  No anxiety   Appears comfortable   Good appetite    Eyes:      Extraocular Movements: Extraocular movements intact.   Pulmonary:      Effort: Pulmonary effort is normal.      Comments: No audible congestion   No cough   Musculoskeletal:      Cervical back: Neck supple.   Neurological:      Mental Status: She is alert.   Psychiatric:         Mood and Affect: Mood normal.         Behavior: Behavior is cooperative.         Assessment/Plan  Problem List Items Addressed This Visit       Dementia (Multi) - Primary     Has cognitive loss with HX bipolar disease  Requires a secure structured environment for her safety   Poor historian   Has good social skills    Currently requires haldol decanoate IM monthly   Monitor behaviors   Monitor side effects.          Anxiety     Lorazepam    Mental health follows          Insomnia     Melatonin  Monitor sleep patterns  She reports that her sleep has improved            Type 2 diabetes mellitus (Multi)     Glucoscans  Pioglitazone  Metformin   Monitor A1c and BS  BS generally <250  Dietitian to evaluate             Bipolar disorder (Multi)     Haloperidol monthly  Monitor mood and behaviors  Mental health follows   Clinical psychologist           Medications, treatments, and labs reviewed  Continue medications and treatments as listed in  EMR    KATRINA Pizarro-SUSHANT      Electronically Signed By: RUSS Pizarro   5/4/24  2:42 PM

## 2024-04-30 NOTE — PROGRESS NOTES
PROGRESS NOTE    Subjective   Chief complaint: Allison Ryan is a 69 y.o. female who is a long term care patient being seen and evaluated for poor sleep.     HPI: is sitting at the dining room table. Is visiting with the clinical psychologist. She appears comfortable, no anxiety evident. She reports that she is sleeping a bit better.   Is prepared for lunch. Has good appetite when served. Denies any complaints.   HPI      Objective   Vital signs: 127/68-82-18-97.7     Physical Exam  Constitutional:       General: She is not in acute distress.     Comments: Social and talkative  No anxiety   Appears comfortable   Good appetite    Eyes:      Extraocular Movements: Extraocular movements intact.   Pulmonary:      Effort: Pulmonary effort is normal.      Comments: No audible congestion   No cough   Musculoskeletal:      Cervical back: Neck supple.   Neurological:      Mental Status: She is alert.   Psychiatric:         Mood and Affect: Mood normal.         Behavior: Behavior is cooperative.         Assessment/Plan   Problem List Items Addressed This Visit       Dementia (Multi) - Primary     Has cognitive loss with HX bipolar disease  Requires a secure structured environment for her safety   Poor historian   Has good social skills    Currently requires haldol decanoate IM monthly   Monitor behaviors   Monitor side effects.          Anxiety     Lorazepam    Mental health follows          Insomnia     Melatonin  Monitor sleep patterns  She reports that her sleep has improved            Type 2 diabetes mellitus (Multi)     Glucoscans  Pioglitazone  Metformin   Monitor A1c and BS  BS generally <250  Dietitian to evaluate             Bipolar disorder (Multi)     Haloperidol monthly  Monitor mood and behaviors  Mental health follows   Clinical psychologist           Medications, treatments, and labs reviewed  Continue medications and treatments as listed in EMR    Sylvie Ann, APRN-CNP

## 2024-05-04 NOTE — ASSESSMENT & PLAN NOTE
Glucoscans  Pioglitazone  Metformin   Monitor A1c and BS  BS generally <250  Dietitian to evaluate

## 2024-05-06 ENCOUNTER — NURSING HOME VISIT (OUTPATIENT)
Dept: POST ACUTE CARE | Facility: EXTERNAL LOCATION | Age: 69
End: 2024-05-06
Payer: MEDICARE

## 2024-05-06 DIAGNOSIS — E11.69 TYPE 2 DIABETES MELLITUS WITH OTHER SPECIFIED COMPLICATION, UNSPECIFIED WHETHER LONG TERM INSULIN USE (MULTI): ICD-10-CM

## 2024-05-06 DIAGNOSIS — G30.0 EARLY ONSET ALZHEIMER'S DEMENTIA WITH MOOD DISTURBANCE, UNSPECIFIED DEMENTIA SEVERITY (MULTI): ICD-10-CM

## 2024-05-06 DIAGNOSIS — F02.83 EARLY ONSET ALZHEIMER'S DEMENTIA WITH MOOD DISTURBANCE, UNSPECIFIED DEMENTIA SEVERITY (MULTI): ICD-10-CM

## 2024-05-06 DIAGNOSIS — F31.9 BIPOLAR AFFECTIVE DISORDER, REMISSION STATUS UNSPECIFIED (MULTI): Primary | ICD-10-CM

## 2024-05-06 PROCEDURE — 99309 SBSQ NF CARE MODERATE MDM 30: CPT | Performed by: NURSE PRACTITIONER

## 2024-05-06 NOTE — LETTER
Patient: Allison Ryan  : 1955    Encounter Date: 2024    PROGRESS NOTE    Subjective  Chief complaint: Allison Ryan is a 69 y.o. female who is a long term care patient being seen and evaluated for blood sugar      HPI  Nursing reported that her blood sugars were higher over the weekend.  Blood sugars reviewed.  Will adjust oral hypoglycemics.  Nursing reports her intake is generally good  and likes her snacks.  She is currently sitting at an activity table watching others.   She is actively responding.  Denies any discomfort.  Reports that she slept fair last night    Objective  Vital signs: 137/72-78-18-97.9    Physical Exam  Vitals and nursing note reviewed.   Constitutional:       General: She is not in acute distress.  Eyes:      Extraocular Movements: Extraocular movements intact.   Cardiovascular:      Rate and Rhythm: Normal rate and regular rhythm.   Pulmonary:      Effort: Pulmonary effort is normal.      Breath sounds: Normal breath sounds.      Comments: Lungs clear   Abdominal:      General: Bowel sounds are normal.      Palpations: Abdomen is soft.   Musculoskeletal:      Cervical back: Neck supple.      Right lower leg: No edema.      Left lower leg: No edema.   Neurological:      Mental Status: She is alert.   Psychiatric:         Mood and Affect: Mood normal.         Behavior: Behavior is cooperative.         Cognition and Memory: Cognition is impaired.      Comments: Poor decision making   Hx anxiety          Assessment/Plan  Problem List Items Addressed This Visit       Dementia (Multi)     Has cognitive loss with HX bipolar disease  Requires a secure structured environment for her safety   Poor historian   Has good social skills    Currently requires haldol decanoate IM monthly   Monitor behaviors   Monitor side effects.          Type 2 diabetes mellitus (Multi)     Glucoscans  Pioglitazone  Metformin - increased   Monitor A1c and BS  Dietitian to evaluate             Bipolar disorder  (Multi) - Primary     Haloperidol monthly  Monitor mood and behaviors  Mental health follows   Clinical psychologist           Medications, treatments, and labs reviewed  Continue medications and treatments as listed in EMR    RUSS Pizarro      Electronically Signed By: RUSS Pizarro   5/6/24  7:34 PM

## 2024-05-06 NOTE — PROGRESS NOTES
PROGRESS NOTE    Subjective   Chief complaint: Allsion Ryan is a 69 y.o. female who is a long term care patient being seen and evaluated for blood sugar      HPI  Nursing reported that her blood sugars were higher over the weekend.  Blood sugars reviewed.  Will adjust oral hypoglycemics.  Nursing reports her intake is generally good  and likes her snacks.  She is currently sitting at an activity table watching others.   She is actively responding.  Denies any discomfort.  Reports that she slept fair last night    Objective   Vital signs: 137/72-78-18-97.9    Physical Exam  Vitals and nursing note reviewed.   Constitutional:       General: She is not in acute distress.  Eyes:      Extraocular Movements: Extraocular movements intact.   Cardiovascular:      Rate and Rhythm: Normal rate and regular rhythm.   Pulmonary:      Effort: Pulmonary effort is normal.      Breath sounds: Normal breath sounds.      Comments: Lungs clear   Abdominal:      General: Bowel sounds are normal.      Palpations: Abdomen is soft.   Musculoskeletal:      Cervical back: Neck supple.      Right lower leg: No edema.      Left lower leg: No edema.   Neurological:      Mental Status: She is alert.   Psychiatric:         Mood and Affect: Mood normal.         Behavior: Behavior is cooperative.         Cognition and Memory: Cognition is impaired.      Comments: Poor decision making   Hx anxiety          Assessment/Plan   Problem List Items Addressed This Visit       Dementia (Multi)     Has cognitive loss with HX bipolar disease  Requires a secure structured environment for her safety   Poor historian   Has good social skills    Currently requires haldol decanoate IM monthly   Monitor behaviors   Monitor side effects.          Type 2 diabetes mellitus (Multi)     Glucoscans  Pioglitazone  Metformin - increased   Monitor A1c and BS  Dietitian to evaluate             Bipolar disorder (Multi) - Primary     Haloperidol monthly  Monitor mood and  behaviors  Mental health follows   Clinical psychologist           Medications, treatments, and labs reviewed  Continue medications and treatments as listed in EMR    Sylvie Ann, APRN-CNP

## 2024-05-13 ENCOUNTER — NURSING HOME VISIT (OUTPATIENT)
Dept: POST ACUTE CARE | Facility: EXTERNAL LOCATION | Age: 69
End: 2024-05-13
Payer: MEDICARE

## 2024-05-13 DIAGNOSIS — F32.A DEPRESSION, UNSPECIFIED DEPRESSION TYPE: ICD-10-CM

## 2024-05-13 DIAGNOSIS — F02.83 EARLY ONSET ALZHEIMER'S DEMENTIA WITH MOOD DISTURBANCE, UNSPECIFIED DEMENTIA SEVERITY (MULTI): Primary | ICD-10-CM

## 2024-05-13 DIAGNOSIS — G30.0 EARLY ONSET ALZHEIMER'S DEMENTIA WITH MOOD DISTURBANCE, UNSPECIFIED DEMENTIA SEVERITY (MULTI): Primary | ICD-10-CM

## 2024-05-13 DIAGNOSIS — F31.9 BIPOLAR AFFECTIVE DISORDER, REMISSION STATUS UNSPECIFIED (MULTI): ICD-10-CM

## 2024-05-13 DIAGNOSIS — G47.00 INSOMNIA, UNSPECIFIED TYPE: ICD-10-CM

## 2024-05-13 DIAGNOSIS — G89.29 OTHER CHRONIC PAIN: ICD-10-CM

## 2024-05-13 DIAGNOSIS — F41.9 ANXIETY: ICD-10-CM

## 2024-05-13 DIAGNOSIS — E11.69 TYPE 2 DIABETES MELLITUS WITH OTHER SPECIFIED COMPLICATION, UNSPECIFIED WHETHER LONG TERM INSULIN USE (MULTI): ICD-10-CM

## 2024-05-13 PROCEDURE — 99309 SBSQ NF CARE MODERATE MDM 30: CPT | Performed by: NURSE PRACTITIONER

## 2024-05-13 NOTE — PROGRESS NOTES
PROGRESS NOTE    Subjective   Chief complaint: Allison Ryan is a 69 y.o. female who is a long term care patient being seen and evaluated to complete statement for financial competency     HPI: pleasant and talkative. Is prepared for lunch. Reviewed current medications. Denies any chest pain or tightness. Reports that she has a good appetite, denies any gastric distress.  Nursing reports that her anxiety has improved and she is more social with other residents.   She reports that she is sleeping better.   Completed professional statement for financial competence.   Reviewed blood sugar levels   HPI      Objective   Vital signs: 112/64-72-18-97.4     Physical Exam  Vitals and nursing note reviewed.   Constitutional:       General: She is not in acute distress.  Eyes:      Extraocular Movements: Extraocular movements intact.   Cardiovascular:      Rate and Rhythm: Normal rate and regular rhythm.   Pulmonary:      Effort: Pulmonary effort is normal.      Breath sounds: Normal breath sounds.      Comments: Lungs clear   Abdominal:      General: Bowel sounds are normal.      Palpations: Abdomen is soft.   Musculoskeletal:      Cervical back: Neck supple.      Right lower leg: No edema.      Left lower leg: No edema.   Neurological:      Mental Status: She is alert.   Psychiatric:         Mood and Affect: Mood normal.         Behavior: Behavior is cooperative.         Cognition and Memory: Cognition is impaired.      Comments: Poor judgement          Assessment/Plan   Problem List Items Addressed This Visit       Dementia (Multi) - Primary     Has cognitive loss with HX bipolar disease  Requires a secure structured environment for her safety   Poor historian   Has good social skills    Currently requires haldol decanoate IM monthly   Monitor behaviors   Monitor side effects.   Completed statement - not competent for own financial responsibilities          Chronic pain     lidocaine patches have been effective           Anxiety     Lorazepam  Mental health follows   Anxiety has improved   Is interacting with other residents   Offers no somatic complaints          Depression     Monitor mood and behaviors.  Trazodone  Meets regularly with mental ani team          Insomnia     Melatonin  Monitor sleep patterns  She reports that her sleep has improved            Type 2 diabetes mellitus (Multi)     Glucoscans  Pioglitazone  Metformin - increased   Monitor A1c and BS  Dietitian to evaluate   BS more stable             Bipolar disorder (Multi)     Haloperidol monthly  Monitor mood and behaviors  Mental health follows   Clinical psychologist           Medications, treatments, and labs reviewed  Continue medications and treatments as listed in EMR    Sylvie Ann, APRN-CNP

## 2024-05-13 NOTE — LETTER
Patient: Allison Ryan  : 1955    Encounter Date: 2024    PROGRESS NOTE    Subjective  Chief complaint: Allison Ryan is a 69 y.o. female who is a long term care patient being seen and evaluated to complete statement for financial competency     HPI: pleasant and talkative. Is prepared for lunch. Reviewed current medications. Denies any chest pain or tightness. Reports that she has a good appetite, denies any gastric distress.  Nursing reports that her anxiety has improved and she is more social with other residents.   She reports that she is sleeping better.   Completed professional statement for financial competence.   Reviewed blood sugar levels   HPI      Objective  Vital signs: 112/64-72-18-97.4     Physical Exam  Vitals and nursing note reviewed.   Constitutional:       General: She is not in acute distress.  Eyes:      Extraocular Movements: Extraocular movements intact.   Cardiovascular:      Rate and Rhythm: Normal rate and regular rhythm.   Pulmonary:      Effort: Pulmonary effort is normal.      Breath sounds: Normal breath sounds.      Comments: Lungs clear   Abdominal:      General: Bowel sounds are normal.      Palpations: Abdomen is soft.   Musculoskeletal:      Cervical back: Neck supple.      Right lower leg: No edema.      Left lower leg: No edema.   Neurological:      Mental Status: She is alert.   Psychiatric:         Mood and Affect: Mood normal.         Behavior: Behavior is cooperative.         Cognition and Memory: Cognition is impaired.      Comments: Poor judgement          Assessment/Plan  Problem List Items Addressed This Visit       Dementia (Multi) - Primary     Has cognitive loss with HX bipolar disease  Requires a secure structured environment for her safety   Poor historian   Has good social skills    Currently requires haldol decanoate IM monthly   Monitor behaviors   Monitor side effects.   Completed statement - not competent for own financial responsibilities           Chronic pain     lidocaine patches have been effective          Anxiety     Lorazepam  Mental health follows   Anxiety has improved   Is interacting with other residents   Offers no somatic complaints          Depression     Monitor mood and behaviors.  Trazodone  Meets regularly with mental ani team          Insomnia     Melatonin  Monitor sleep patterns  She reports that her sleep has improved            Type 2 diabetes mellitus (Multi)     Glucoscans  Pioglitazone  Metformin - increased   Monitor A1c and BS  Dietitian to evaluate   BS more stable             Bipolar disorder (Multi)     Haloperidol monthly  Monitor mood and behaviors  Mental health follows   Clinical psychologist           Medications, treatments, and labs reviewed  Continue medications and treatments as listed in EMR    RUSS Pizarro      Electronically Signed By: RUSS Pizarro   5/13/24  7:10 PM

## 2024-05-13 NOTE — ASSESSMENT & PLAN NOTE
Has cognitive loss with HX bipolar disease  Requires a secure structured environment for her safety   Poor historian   Has good social skills    Currently requires haldol decanoate IM monthly   Monitor behaviors   Monitor side effects.   Completed statement - not competent for own financial responsibilities

## 2024-05-13 NOTE — ASSESSMENT & PLAN NOTE
Glucoscans  Pioglitazone  Metformin - increased   Monitor A1c and BS  Dietitian to evaluate   BS more stable

## 2024-05-13 NOTE — ASSESSMENT & PLAN NOTE
Lorazepam  Mental health follows   Anxiety has improved   Is interacting with other residents   Offers no somatic complaints

## 2024-05-20 ENCOUNTER — NURSING HOME VISIT (OUTPATIENT)
Dept: POST ACUTE CARE | Facility: EXTERNAL LOCATION | Age: 69
End: 2024-05-20
Payer: MEDICARE

## 2024-05-20 DIAGNOSIS — F31.9 BIPOLAR AFFECTIVE DISORDER, REMISSION STATUS UNSPECIFIED (MULTI): ICD-10-CM

## 2024-05-20 DIAGNOSIS — G30.0 EARLY ONSET ALZHEIMER'S DEMENTIA WITH MOOD DISTURBANCE, UNSPECIFIED DEMENTIA SEVERITY (MULTI): ICD-10-CM

## 2024-05-20 DIAGNOSIS — G89.29 OTHER CHRONIC PAIN: ICD-10-CM

## 2024-05-20 DIAGNOSIS — F32.A DEPRESSION, UNSPECIFIED DEPRESSION TYPE: ICD-10-CM

## 2024-05-20 DIAGNOSIS — E11.69 TYPE 2 DIABETES MELLITUS WITH OTHER SPECIFIED COMPLICATION, UNSPECIFIED WHETHER LONG TERM INSULIN USE (MULTI): ICD-10-CM

## 2024-05-20 DIAGNOSIS — F02.83 EARLY ONSET ALZHEIMER'S DEMENTIA WITH MOOD DISTURBANCE, UNSPECIFIED DEMENTIA SEVERITY (MULTI): ICD-10-CM

## 2024-05-20 DIAGNOSIS — G47.00 INSOMNIA, UNSPECIFIED TYPE: ICD-10-CM

## 2024-05-20 DIAGNOSIS — F41.9 ANXIETY: Primary | ICD-10-CM

## 2024-05-20 PROCEDURE — 99308 SBSQ NF CARE LOW MDM 20: CPT | Performed by: NURSE PRACTITIONER

## 2024-05-20 NOTE — LETTER
Patient: Allison Ryan  : 1955    Encounter Date: 2024    PROGRESS NOTE    Subjective  Chief complaint: Allison Ryan is a 69 y.o. female who is a long term care patient being seen and evaluated for sleep habits    HPI: Is in her room being prepared for lunch.  Nursing reports that she has been eating her lunch and all meals out in the dining room.   She  reports that she is sleeping a bit better.  She denies any discomfort or concerns.  Spoke with the clinical psychologist who visits  -  reports that she has been social with others and attending some of the activity programs.   HPI      Objective  Vital signs: 122/65-72-18-97.3     Physical Exam  Vitals and nursing note reviewed.   Constitutional:       General: She is not in acute distress.  Eyes:      Extraocular Movements: Extraocular movements intact.   Cardiovascular:      Rate and Rhythm: Normal rate and regular rhythm.   Pulmonary:      Effort: Pulmonary effort is normal.      Breath sounds: Normal breath sounds.      Comments: Lungs clear   Abdominal:      General: Bowel sounds are normal.      Palpations: Abdomen is soft.   Musculoskeletal:      Cervical back: Neck supple.      Right lower leg: No edema.      Left lower leg: No edema.   Neurological:      Mental Status: She is alert.   Psychiatric:         Mood and Affect: Mood normal.         Behavior: Behavior is cooperative.         Cognition and Memory: Cognition is impaired.         Assessment/Plan  Problem List Items Addressed This Visit       Dementia (Multi)     Has cognitive loss with HX bipolar disease  Requires a secure structured environment for her safety   Poor historian   Has good social skills    Currently requires haldol decanoate IM monthly   Monitor behaviors   Monitor side effects.   Completed statement - not competent for own financial responsibilities          Chronic pain     lidocaine patches have been effective          Anxiety - Primary     Lorazepam  Mental health follows    Anxiety has improved   Is interacting with other residents   Offers no somatic complaints          Depression     Monitor mood and behaviors.  Trazodone  Meets regularly with mental ani team   Sleeping improved and attending activity programs          Insomnia     Melatonin  Monitor sleep patterns  She reports that her sleep has improved            Type 2 diabetes mellitus (Multi)     Glucoscans  Pioglitazone  Metformin - increased   Monitor A1c and BS  Dietitian to evaluate   BS more stable             Bipolar disorder (Multi)     Haloperidol monthly  Monitor mood and behaviors  Mental health follows   Clinical psychologist -           Medications, treatments, and labs reviewed  Continue medications and treatments as listed in EMR    RUSS Pizarro      Electronically Signed By: RUSS Pizarro   5/20/24  9:35 PM

## 2024-05-20 NOTE — PROGRESS NOTES
PROGRESS NOTE    Subjective   Chief complaint: Allison Ryan is a 69 y.o. female who is a long term care patient being seen and evaluated for sleep habits    HPI: Is in her room being prepared for lunch.  Nursing reports that she has been eating her lunch and all meals out in the dining room.   She  reports that she is sleeping a bit better.  She denies any discomfort or concerns.  Spoke with the clinical psychologist who visits  -  reports that she has been social with others and attending some of the activity programs.   HPI      Objective   Vital signs: 122/65-72-18-97.3     Physical Exam  Vitals and nursing note reviewed.   Constitutional:       General: She is not in acute distress.  Eyes:      Extraocular Movements: Extraocular movements intact.   Cardiovascular:      Rate and Rhythm: Normal rate and regular rhythm.   Pulmonary:      Effort: Pulmonary effort is normal.      Breath sounds: Normal breath sounds.      Comments: Lungs clear   Abdominal:      General: Bowel sounds are normal.      Palpations: Abdomen is soft.   Musculoskeletal:      Cervical back: Neck supple.      Right lower leg: No edema.      Left lower leg: No edema.   Neurological:      Mental Status: She is alert.   Psychiatric:         Mood and Affect: Mood normal.         Behavior: Behavior is cooperative.         Cognition and Memory: Cognition is impaired.         Assessment/Plan   Problem List Items Addressed This Visit       Dementia (Multi)     Has cognitive loss with HX bipolar disease  Requires a secure structured environment for her safety   Poor historian   Has good social skills    Currently requires haldol decanoate IM monthly   Monitor behaviors   Monitor side effects.   Completed statement - not competent for own financial responsibilities          Chronic pain     lidocaine patches have been effective          Anxiety - Primary     Lorazepam  Mental health follows   Anxiety has improved   Is interacting with other residents    Offers no somatic complaints          Depression     Monitor mood and behaviors.  Trazodone  Meets regularly with mental ani team   Sleeping improved and attending activity programs          Insomnia     Melatonin  Monitor sleep patterns  She reports that her sleep has improved            Type 2 diabetes mellitus (Multi)     Glucoscans  Pioglitazone  Metformin - increased   Monitor A1c and BS  Dietitian to evaluate   BS more stable             Bipolar disorder (Multi)     Haloperidol monthly  Monitor mood and behaviors  Mental health follows   Clinical psychologist -           Medications, treatments, and labs reviewed  Continue medications and treatments as listed in EMR    Sylvie Ann, APRN-CNP

## 2024-05-21 NOTE — ASSESSMENT & PLAN NOTE
Monitor mood and behaviors.  Trazodone  Meets regularly with mental ani team   Sleeping improved and attending activity programs

## 2024-05-29 ENCOUNTER — NURSING HOME VISIT (OUTPATIENT)
Dept: POST ACUTE CARE | Facility: EXTERNAL LOCATION | Age: 69
End: 2024-05-29
Payer: MEDICARE

## 2024-05-29 DIAGNOSIS — G89.29 OTHER CHRONIC PAIN: ICD-10-CM

## 2024-05-29 DIAGNOSIS — F32.A DEPRESSION, UNSPECIFIED DEPRESSION TYPE: ICD-10-CM

## 2024-05-29 DIAGNOSIS — G30.0 EARLY ONSET ALZHEIMER'S DEMENTIA WITH MOOD DISTURBANCE, UNSPECIFIED DEMENTIA SEVERITY (MULTI): ICD-10-CM

## 2024-05-29 DIAGNOSIS — F31.9 BIPOLAR AFFECTIVE DISORDER, REMISSION STATUS UNSPECIFIED (MULTI): ICD-10-CM

## 2024-05-29 DIAGNOSIS — E11.69 TYPE 2 DIABETES MELLITUS WITH OTHER SPECIFIED COMPLICATION, UNSPECIFIED WHETHER LONG TERM INSULIN USE (MULTI): Primary | ICD-10-CM

## 2024-05-29 DIAGNOSIS — F02.83 EARLY ONSET ALZHEIMER'S DEMENTIA WITH MOOD DISTURBANCE, UNSPECIFIED DEMENTIA SEVERITY (MULTI): ICD-10-CM

## 2024-05-29 PROCEDURE — 99309 SBSQ NF CARE MODERATE MDM 30: CPT | Performed by: NURSE PRACTITIONER

## 2024-05-29 NOTE — LETTER
Patient: Allison Ryan  : 1955    Encounter Date: 2024    PROGRESS NOTE    Subjective  Chief complaint: Allison Ryan is a 69 y.o. female who is a long term care patient being seen and evaluated for blood sugar regulation    HPI:  reviewed blood sugar levels.  Has had no episodes of any hypoglycemia.  Will reduce the frequency of blood sugars checks.  Nursing reports that she has been more engaged with other residents and is sitting out in the  in the dining room.  Is prepared for lunch  Pleasant and talkative. Denies any concerns.   Reports that she is hungry.     HPI      Objective  Vital signs: 126/73-67-18-97.4     Physical Exam  Vitals and nursing note reviewed.   Constitutional:       General: She is not in acute distress.  Eyes:      Extraocular Movements: Extraocular movements intact.   Cardiovascular:      Rate and Rhythm: Normal rate and regular rhythm.   Pulmonary:      Effort: Pulmonary effort is normal.      Breath sounds: Normal breath sounds.      Comments: Lungs clear   Abdominal:      General: Bowel sounds are normal.      Palpations: Abdomen is soft.   Musculoskeletal:      Cervical back: Neck supple.      Right lower leg: No edema.      Left lower leg: No edema.   Neurological:      Mental Status: She is alert.   Psychiatric:         Mood and Affect: Mood normal.         Behavior: Behavior is cooperative.         Cognition and Memory: Cognition is impaired.         Assessment/Plan  Problem List Items Addressed This Visit       Dementia (Multi)     Has cognitive loss with HX bipolar disease  Requires a secure structured environment for her safety   Poor historian   Has good social skills    Currently requires haldol decanoate IM monthly   Monitor behaviors   Monitor side effects.   Completed statement - not competent for own financial responsibilities          Chronic pain     lidocaine patches have been effective          Depression     Monitor mood and behaviors.  Trazodone  Meets  regularly with mental ani team   Sleeping improved and attending activity programs   Social skills have improved          Type 2 diabetes mellitus (Multi) - Primary     Pioglitazone  Metformin - Monitor A1c and FBS  Dietitian to evaluate   BS more stable will reduce frequency of BS checks to bid             Bipolar disorder (Multi)     Haloperidol monthly  Monitor mood and behaviors  Mental health follows   Clinical psychologist -           Medications, treatments, and labs reviewed  Continue medications and treatments as listed in EMR    RUSS Pizarro      Electronically Signed By: RUSS Pizarro   6/1/24 12:37 PM

## 2024-05-30 NOTE — PROGRESS NOTES
PROGRESS NOTE    Subjective   Chief complaint: Allison Ryan is a 69 y.o. female who is a long term care patient being seen and evaluated for blood sugar regulation    HPI:  reviewed blood sugar levels.  Has had no episodes of any hypoglycemia.  Will reduce the frequency of blood sugars checks.  Nursing reports that she has been more engaged with other residents and is sitting out in the  in the dining room.  Is prepared for lunch  Pleasant and talkative. Denies any concerns.   Reports that she is hungry.     HPI      Objective   Vital signs: 126/73-67-18-97.4     Physical Exam  Vitals and nursing note reviewed.   Constitutional:       General: She is not in acute distress.  Eyes:      Extraocular Movements: Extraocular movements intact.   Cardiovascular:      Rate and Rhythm: Normal rate and regular rhythm.   Pulmonary:      Effort: Pulmonary effort is normal.      Breath sounds: Normal breath sounds.      Comments: Lungs clear   Abdominal:      General: Bowel sounds are normal.      Palpations: Abdomen is soft.   Musculoskeletal:      Cervical back: Neck supple.      Right lower leg: No edema.      Left lower leg: No edema.   Neurological:      Mental Status: She is alert.   Psychiatric:         Mood and Affect: Mood normal.         Behavior: Behavior is cooperative.         Cognition and Memory: Cognition is impaired.         Assessment/Plan   Problem List Items Addressed This Visit       Dementia (Multi)     Has cognitive loss with HX bipolar disease  Requires a secure structured environment for her safety   Poor historian   Has good social skills    Currently requires haldol decanoate IM monthly   Monitor behaviors   Monitor side effects.   Completed statement - not competent for own financial responsibilities          Chronic pain     lidocaine patches have been effective          Depression     Monitor mood and behaviors.  Trazodone  Meets regularly with mental ani team   Sleeping improved and attending  activity programs   Social skills have improved          Type 2 diabetes mellitus (Multi) - Primary     Pioglitazone  Metformin - Monitor A1c and FBS  Dietitian to evaluate   BS more stable will reduce frequency of BS checks to bid             Bipolar disorder (Multi)     Haloperidol monthly  Monitor mood and behaviors  Mental health follows   Clinical psychologist -           Medications, treatments, and labs reviewed  Continue medications and treatments as listed in EMR    Sylvie Ann, APRN-CNP

## 2024-06-01 NOTE — ASSESSMENT & PLAN NOTE
Monitor mood and behaviors.  Trazodone  Meets regularly with mental ani team   Sleeping improved and attending activity programs   Social skills have improved

## 2024-06-01 NOTE — ASSESSMENT & PLAN NOTE
Pioglitazone  Metformin - Monitor A1c and FBS  Dietitian to evaluate   BS more stable will reduce frequency of BS checks to bid

## 2024-06-10 ENCOUNTER — NURSING HOME VISIT (OUTPATIENT)
Dept: POST ACUTE CARE | Facility: EXTERNAL LOCATION | Age: 69
End: 2024-06-10
Payer: MEDICARE

## 2024-06-10 DIAGNOSIS — F32.A DEPRESSION, UNSPECIFIED DEPRESSION TYPE: ICD-10-CM

## 2024-06-10 DIAGNOSIS — G47.00 INSOMNIA, UNSPECIFIED TYPE: ICD-10-CM

## 2024-06-10 DIAGNOSIS — G89.29 OTHER CHRONIC PAIN: ICD-10-CM

## 2024-06-10 DIAGNOSIS — F02.83 EARLY ONSET ALZHEIMER'S DEMENTIA WITH MOOD DISTURBANCE, UNSPECIFIED DEMENTIA SEVERITY (MULTI): Primary | ICD-10-CM

## 2024-06-10 DIAGNOSIS — F41.9 ANXIETY: ICD-10-CM

## 2024-06-10 DIAGNOSIS — F31.9 BIPOLAR AFFECTIVE DISORDER, REMISSION STATUS UNSPECIFIED (MULTI): ICD-10-CM

## 2024-06-10 DIAGNOSIS — E11.69 TYPE 2 DIABETES MELLITUS WITH OTHER SPECIFIED COMPLICATION, UNSPECIFIED WHETHER LONG TERM INSULIN USE (MULTI): ICD-10-CM

## 2024-06-10 DIAGNOSIS — G30.0 EARLY ONSET ALZHEIMER'S DEMENTIA WITH MOOD DISTURBANCE, UNSPECIFIED DEMENTIA SEVERITY (MULTI): Primary | ICD-10-CM

## 2024-06-10 NOTE — LETTER
Patient: Allison Ryan  : 1955    Encounter Date: 06/10/2024    PROGRESS NOTE    Subjective  Chief complaint: Allison Ryan is a 69 y.o. female who is a long term care patient being seen and evaluated for sleep habits     HPI: ambulatory is in route for lunch. She reports that she is doing well. Smiling when approached. She reports that her sleep has improved. She denies any concerns. Nursing reports that she is participating in some group activities.   Discussed with clinical psychologist who reports that she has improved with acceptance of her needed assistance from staff for her care.    Reviewed medications and labs   HPI      Objective  Vital signs: 128/76-72-18-98.3     Physical Exam  Vitals and nursing note reviewed.   Constitutional:       General: She is not in acute distress.  Eyes:      Extraocular Movements: Extraocular movements intact.   Cardiovascular:      Rate and Rhythm: Normal rate and regular rhythm.   Pulmonary:      Effort: Pulmonary effort is normal.      Breath sounds: Normal breath sounds.      Comments: Lungs clear   Abdominal:      General: Bowel sounds are normal.      Palpations: Abdomen is soft.   Musculoskeletal:      Cervical back: Neck supple.      Right lower leg: No edema.      Left lower leg: No edema.   Neurological:      Mental Status: She is alert.   Psychiatric:         Mood and Affect: Mood normal.         Behavior: Behavior is cooperative.         Cognition and Memory: Cognition is impaired.         Assessment/Plan  Problem List Items Addressed This Visit       Dementia (Multi) - Primary     Has cognitive loss with HX bipolar disease  Requires a secure structured environment for her safety   Poor historian   Has good social skills    Currently requires haldol decanoate IM monthly   Monitor behaviors   Monitor side effects.   Completed statement - not competent for own financial responsibilities          Chronic pain     lidocaine patches have been effective   Reports no  discomfort          Anxiety     Lorazepam  Mental health follows   Anxiety has improved   Is interacting with other residents   Offers no somatic complaints          Depression     Monitor mood and behaviors.  Trazodone  Meets regularly with mental ani team   Sleeping improved and attending activity programs   Social skills have improved          Insomnia     Melatonin  Monitor sleep patterns  She reports that her sleep has improved            Type 2 diabetes mellitus (Multi)     Pioglitazone  Metformin - Monitor A1c and FBS  Dietitian to evaluate   BS more stable will reduce frequency of BS checks to bid             Bipolar disorder (Multi)     Haloperidol monthly  Monitor mood and behaviors  Mental health follows   Clinical psychologist - follows           Medications, treatments, and labs reviewed  Continue medications and treatments as listed in EMR    RUSS Pizarro      Electronically Signed By: RUSS Pizarro   6/15/24 10:01 AM

## 2024-06-11 NOTE — PROGRESS NOTES
PROGRESS NOTE    Subjective   Chief complaint: Allison Ryan is a 69 y.o. female who is a long term care patient being seen and evaluated for sleep habits     HPI: ambulatory is in route for lunch. She reports that she is doing well. Smiling when approached. She reports that her sleep has improved. She denies any concerns. Nursing reports that she is participating in some group activities.   Discussed with clinical psychologist who reports that she has improved with acceptance of her needed assistance from staff for her care.    Reviewed medications and labs   HPI      Objective   Vital signs: 128/76-72-18-98.3     Physical Exam  Vitals and nursing note reviewed.   Constitutional:       General: She is not in acute distress.  Eyes:      Extraocular Movements: Extraocular movements intact.   Cardiovascular:      Rate and Rhythm: Normal rate and regular rhythm.   Pulmonary:      Effort: Pulmonary effort is normal.      Breath sounds: Normal breath sounds.      Comments: Lungs clear   Abdominal:      General: Bowel sounds are normal.      Palpations: Abdomen is soft.   Musculoskeletal:      Cervical back: Neck supple.      Right lower leg: No edema.      Left lower leg: No edema.   Neurological:      Mental Status: She is alert.   Psychiatric:         Mood and Affect: Mood normal.         Behavior: Behavior is cooperative.         Cognition and Memory: Cognition is impaired.         Assessment/Plan   Problem List Items Addressed This Visit       Dementia (Multi) - Primary     Has cognitive loss with HX bipolar disease  Requires a secure structured environment for her safety   Poor historian   Has good social skills    Currently requires haldol decanoate IM monthly   Monitor behaviors   Monitor side effects.   Completed statement - not competent for own financial responsibilities          Chronic pain     lidocaine patches have been effective   Reports no discomfort          Anxiety     Lorazepam  Mental health follows    Anxiety has improved   Is interacting with other residents   Offers no somatic complaints          Depression     Monitor mood and behaviors.  Trazodone  Meets regularly with mental ani team   Sleeping improved and attending activity programs   Social skills have improved          Insomnia     Melatonin  Monitor sleep patterns  She reports that her sleep has improved            Type 2 diabetes mellitus (Multi)     Pioglitazone  Metformin - Monitor A1c and FBS  Dietitian to evaluate   BS more stable will reduce frequency of BS checks to bid             Bipolar disorder (Multi)     Haloperidol monthly  Monitor mood and behaviors  Mental health follows   Clinical psychologist - follows           Medications, treatments, and labs reviewed  Continue medications and treatments as listed in EMR    Sylvie Ann, APRN-CNP

## 2024-06-15 NOTE — ASSESSMENT & PLAN NOTE
Haloperidol monthly  Monitor mood and behaviors  Mental health follows   Clinical psychologist - follows

## 2024-06-17 ENCOUNTER — NURSING HOME VISIT (OUTPATIENT)
Dept: POST ACUTE CARE | Facility: EXTERNAL LOCATION | Age: 69
End: 2024-06-17
Payer: MEDICARE

## 2024-06-17 DIAGNOSIS — G30.0 EARLY ONSET ALZHEIMER'S DEMENTIA WITH MOOD DISTURBANCE, UNSPECIFIED DEMENTIA SEVERITY (MULTI): ICD-10-CM

## 2024-06-17 DIAGNOSIS — G47.00 INSOMNIA, UNSPECIFIED TYPE: Primary | ICD-10-CM

## 2024-06-17 DIAGNOSIS — G89.29 OTHER CHRONIC PAIN: ICD-10-CM

## 2024-06-17 DIAGNOSIS — F02.83 EARLY ONSET ALZHEIMER'S DEMENTIA WITH MOOD DISTURBANCE, UNSPECIFIED DEMENTIA SEVERITY (MULTI): ICD-10-CM

## 2024-06-17 DIAGNOSIS — F32.A DEPRESSION, UNSPECIFIED DEPRESSION TYPE: ICD-10-CM

## 2024-06-17 DIAGNOSIS — F31.9 BIPOLAR AFFECTIVE DISORDER, REMISSION STATUS UNSPECIFIED (MULTI): ICD-10-CM

## 2024-06-17 DIAGNOSIS — F41.9 ANXIETY: ICD-10-CM

## 2024-06-17 DIAGNOSIS — E11.69 TYPE 2 DIABETES MELLITUS WITH OTHER SPECIFIED COMPLICATION, UNSPECIFIED WHETHER LONG TERM INSULIN USE (MULTI): ICD-10-CM

## 2024-06-17 PROCEDURE — 99309 SBSQ NF CARE MODERATE MDM 30: CPT | Performed by: NURSE PRACTITIONER

## 2024-06-17 NOTE — LETTER
Patient: Allison Ryan  : 1955    Encounter Date: 2024    PROGRESS NOTE    Subjective  Chief complaint: Allison Ryan is a 69 y.o. female who is a long term care patient being seen and evaluated for poor sleep     HPI: She requested assessment.  She reports that she isn't sleeping past 2 days  and requested an increase in her  melatonin. She reports that she is doing well during the day and is staying busy.   She reports that her sleep has been interrupted with her new roommate. She reports that her appetite has improved. Denies any discomfort.   Talkative about a book she is reading.      HPI      Objective  Vital signs: 129/76-71-17-98.5     Physical Exam  Vitals and nursing note reviewed.   Constitutional:       General: She is not in acute distress.  Eyes:      Extraocular Movements: Extraocular movements intact.   Cardiovascular:      Rate and Rhythm: Normal rate and regular rhythm.   Pulmonary:      Effort: Pulmonary effort is normal.      Breath sounds: Normal breath sounds.      Comments: Lungs clear   Abdominal:      General: Bowel sounds are normal.      Palpations: Abdomen is soft.   Musculoskeletal:      Cervical back: Neck supple.      Right lower leg: No edema.      Left lower leg: No edema.   Neurological:      Mental Status: She is alert.   Psychiatric:         Mood and Affect: Mood normal.         Behavior: Behavior is cooperative.         Cognition and Memory: Cognition is impaired.      Comments: Reports poor sleep          Assessment/Plan  Problem List Items Addressed This Visit       Dementia (Multi)     Has cognitive loss with HX bipolar disease  Requires a secure structured environment for her safety   Poor historian   Has good social skills    Currently requires haldol decanoate IM monthly   Monitor behaviors   Monitor side effects.   Completed statement - not competent for own financial responsibilities          Chronic pain     lidocaine patches have been effective   Reports no  discomfort          Anxiety     Lorazepam  Mental health follows   Anxiety has improved   Is interacting with other residents   Offers no somatic complaints          Depression     Monitor mood and behaviors.  Trazodone  Meets regularly with mental ani team   And is participating in activity programs on unit   Social skills have improved          Insomnia - Primary     Reports that her sleep is being interrupted by a new roommate   Requested increase in melatonin, increased dose at this visit     Monitor sleep patterns  Reported to nursing supervisor to monitor sleep and adjustment with new roommate             Type 2 diabetes mellitus (Multi)     Pioglitazone  Metformin - Monitor A1c and FBS  Dietitian to evaluate   BS bid             Bipolar disorder (Multi)     Haloperidol monthly  Monitor mood and behaviors  Mental health follows   Clinical psychologist - follows           Medications, treatments, and labs reviewed  Continue medications and treatments as listed in EMR    RUSS Pizarro      Electronically Signed By: RUSS Pizarro   6/22/24  9:07 AM

## 2024-06-21 NOTE — PROGRESS NOTES
PROGRESS NOTE    Subjective   Chief complaint: Allison Ryan is a 69 y.o. female who is a long term care patient being seen and evaluated for poor sleep     HPI: She requested assessment.  She reports that she isn't sleeping past 2 days  and requested an increase in her  melatonin. She reports that she is doing well during the day and is staying busy.   She reports that her sleep has been interrupted with her new roommate. She reports that her appetite has improved. Denies any discomfort.   Talkative about a book she is reading.      HPI      Objective   Vital signs: 129/76-71-17-98.5     Physical Exam  Vitals and nursing note reviewed.   Constitutional:       General: She is not in acute distress.  Eyes:      Extraocular Movements: Extraocular movements intact.   Cardiovascular:      Rate and Rhythm: Normal rate and regular rhythm.   Pulmonary:      Effort: Pulmonary effort is normal.      Breath sounds: Normal breath sounds.      Comments: Lungs clear   Abdominal:      General: Bowel sounds are normal.      Palpations: Abdomen is soft.   Musculoskeletal:      Cervical back: Neck supple.      Right lower leg: No edema.      Left lower leg: No edema.   Neurological:      Mental Status: She is alert.   Psychiatric:         Mood and Affect: Mood normal.         Behavior: Behavior is cooperative.         Cognition and Memory: Cognition is impaired.      Comments: Reports poor sleep          Assessment/Plan   Problem List Items Addressed This Visit       Dementia (Multi)     Has cognitive loss with HX bipolar disease  Requires a secure structured environment for her safety   Poor historian   Has good social skills    Currently requires haldol decanoate IM monthly   Monitor behaviors   Monitor side effects.   Completed statement - not competent for own financial responsibilities          Chronic pain     lidocaine patches have been effective   Reports no discomfort          Anxiety     Lorazepam  Mental health follows    Anxiety has improved   Is interacting with other residents   Offers no somatic complaints          Depression     Monitor mood and behaviors.  Trazodone  Meets regularly with mental ani team   And is participating in activity programs on unit   Social skills have improved          Insomnia - Primary     Reports that her sleep is being interrupted by a new roommate   Requested increase in melatonin, increased dose at this visit     Monitor sleep patterns  Reported to nursing supervisor to monitor sleep and adjustment with new roommate             Type 2 diabetes mellitus (Multi)     Pioglitazone  Metformin - Monitor A1c and FBS  Dietitian to evaluate   BS bid             Bipolar disorder (Multi)     Haloperidol monthly  Monitor mood and behaviors  Mental health follows   Clinical psychologist - follows           Medications, treatments, and labs reviewed  Continue medications and treatments as listed in EMR    Sylvie Ann, APRN-CNP

## 2024-06-22 NOTE — ASSESSMENT & PLAN NOTE
Reports that her sleep is being interrupted by a new roommate   Requested increase in melatonin, increased dose at this visit     Monitor sleep patterns  Reported to nursing supervisor to monitor sleep and adjustment with new roommate

## 2024-06-22 NOTE — ASSESSMENT & PLAN NOTE
Monitor mood and behaviors.  Trazodone  Meets regularly with mental ani team   And is participating in activity programs on unit   Social skills have improved

## 2024-07-22 ENCOUNTER — NURSING HOME VISIT (OUTPATIENT)
Dept: POST ACUTE CARE | Facility: EXTERNAL LOCATION | Age: 69
End: 2024-07-22
Payer: MEDICARE

## 2024-07-22 DIAGNOSIS — G89.29 OTHER CHRONIC PAIN: ICD-10-CM

## 2024-07-22 DIAGNOSIS — G30.0 EARLY ONSET ALZHEIMER'S DEMENTIA WITH MOOD DISTURBANCE, UNSPECIFIED DEMENTIA SEVERITY (MULTI): ICD-10-CM

## 2024-07-22 DIAGNOSIS — F41.9 ANXIETY: ICD-10-CM

## 2024-07-22 DIAGNOSIS — F31.9 BIPOLAR AFFECTIVE DISORDER, REMISSION STATUS UNSPECIFIED (MULTI): ICD-10-CM

## 2024-07-22 DIAGNOSIS — F02.83 EARLY ONSET ALZHEIMER'S DEMENTIA WITH MOOD DISTURBANCE, UNSPECIFIED DEMENTIA SEVERITY (MULTI): ICD-10-CM

## 2024-07-22 DIAGNOSIS — E11.69 TYPE 2 DIABETES MELLITUS WITH OTHER SPECIFIED COMPLICATION, UNSPECIFIED WHETHER LONG TERM INSULIN USE (MULTI): Primary | ICD-10-CM

## 2024-07-22 DIAGNOSIS — F32.A DEPRESSION, UNSPECIFIED DEPRESSION TYPE: ICD-10-CM

## 2024-07-22 DIAGNOSIS — G47.00 INSOMNIA, UNSPECIFIED TYPE: ICD-10-CM

## 2024-07-22 PROCEDURE — 99310 SBSQ NF CARE HIGH MDM 45: CPT | Performed by: NURSE PRACTITIONER

## 2024-07-22 NOTE — LETTER
Patient: Allison Ryan  : 1955    Encounter Date: 2024    PROGRESS NOTE    Subjective  Chief complaint: Allison Ryan is a 69 y.o. female who is a long term care patient being seen and evaluated for monthly medical care note     HPI: Pleasant and talkative. Reports that she is sleeping better and  reports that he new room is quieter.  She denies any concerns. Nursing reports that she is participating in some group activities.   She denies any chest pain or tightness.    Reports that she has a good appetite. Denies any gastric distress. No nausea, no vomiting, no diarrhea or constipation.   Discussed with clinical psychologist who reports that she is making better choices.   Reviewed medications and labs   Current weight -141  HPI      Objective  Vital signs: 134/74-72-16-98.1     Physical Exam  Vitals and nursing note reviewed.   Constitutional:       General: She is not in acute distress.  Eyes:      Extraocular Movements: Extraocular movements intact.   Cardiovascular:      Rate and Rhythm: Normal rate and regular rhythm.   Pulmonary:      Effort: Pulmonary effort is normal.      Breath sounds: Normal breath sounds.      Comments: Lungs clear   Abdominal:      General: Bowel sounds are normal.      Palpations: Abdomen is soft.   Musculoskeletal:      Cervical back: Neck supple.      Right lower leg: No edema.      Left lower leg: No edema.   Neurological:      Mental Status: She is alert.   Psychiatric:         Mood and Affect: Mood normal.         Behavior: Behavior is cooperative.         Assessment/Plan  Problem List Items Addressed This Visit       Dementia (Multi)     Has cognitive loss with HX bipolar disease  Requires a secure structured environment for her safety   Poor historian   Has good social skills    Currently requires haldol decanoate IM monthly   Monitor behaviors   Monitor side effects.   Completed statement - not competent for own financial responsibilities          Chronic pain      Biofreeze has been more effective - arthritis          Anxiety     Lorazepam  Mental health follows   Anxiety has improved   Is interacting with other residents   Offers no somatic complaints          Depression     Monitor mood and behaviors.  Trazodone  Meets regularly with mental ani team   And is participating in activity programs on unit   Social skills have improved          Insomnia       Sleep has improved   Has new quieter room  Melatonin          Type 2 diabetes mellitus (Multi) - Primary     Pioglitazone  Metformin - Monitor A1c and FBS  Dietitian to evaluate   BS bid   No evidence hypoglycemia             Bipolar disorder (Multi)     Haloperidol monthly  Monitor mood and behaviors  Mental health follows   Clinical psychologist - follows and reports that she is making better choices           Medications, treatments, and labs reviewed  Continue medications and treatments as listed in EMR    RUSS Pizarro      Electronically Signed By: RUSS Pizarro   7/25/24  8:46 PM

## 2024-07-23 NOTE — PROGRESS NOTES
PROGRESS NOTE    Subjective   Chief complaint: Allison Ryan is a 69 y.o. female who is a long term care patient being seen and evaluated for monthly medical care note     HPI: Pleasant and talkative. Reports that she is sleeping better and  reports that he new room is quieter.  She denies any concerns. Nursing reports that she is participating in some group activities.   She denies any chest pain or tightness.    Reports that she has a good appetite. Denies any gastric distress. No nausea, no vomiting, no diarrhea or constipation.   Discussed with clinical psychologist who reports that she is making better choices.   Reviewed medications and labs   Current weight -141  HPI      Objective   Vital signs: 134/74-72-16-98.1     Physical Exam  Vitals and nursing note reviewed.   Constitutional:       General: She is not in acute distress.  Eyes:      Extraocular Movements: Extraocular movements intact.   Cardiovascular:      Rate and Rhythm: Normal rate and regular rhythm.   Pulmonary:      Effort: Pulmonary effort is normal.      Breath sounds: Normal breath sounds.      Comments: Lungs clear   Abdominal:      General: Bowel sounds are normal.      Palpations: Abdomen is soft.   Musculoskeletal:      Cervical back: Neck supple.      Right lower leg: No edema.      Left lower leg: No edema.   Neurological:      Mental Status: She is alert.   Psychiatric:         Mood and Affect: Mood normal.         Behavior: Behavior is cooperative.         Assessment/Plan   Problem List Items Addressed This Visit       Dementia (Multi)     Has cognitive loss with HX bipolar disease  Requires a secure structured environment for her safety   Poor historian   Has good social skills    Currently requires haldol decanoate IM monthly   Monitor behaviors   Monitor side effects.   Completed statement - not competent for own financial responsibilities          Chronic pain     Biofreeze has been more effective - arthritis          Anxiety      Lorazepam  Mental health follows   Anxiety has improved   Is interacting with other residents   Offers no somatic complaints          Depression     Monitor mood and behaviors.  Trazodone  Meets regularly with mental ani team   And is participating in activity programs on unit   Social skills have improved          Insomnia       Sleep has improved   Has new quieter room  Melatonin          Type 2 diabetes mellitus (Multi) - Primary     Pioglitazone  Metformin - Monitor A1c and FBS  Dietitian to evaluate   BS bid   No evidence hypoglycemia             Bipolar disorder (Multi)     Haloperidol monthly  Monitor mood and behaviors  Mental health follows   Clinical psychologist - follows and reports that she is making better choices           Medications, treatments, and labs reviewed  Continue medications and treatments as listed in EMR    Sylvie Ann, APRN-CNP

## 2024-07-26 NOTE — ASSESSMENT & PLAN NOTE
Haloperidol monthly  Monitor mood and behaviors  Mental health follows   Clinical psychologist - follows and reports that she is making better choices

## 2024-07-26 NOTE — ASSESSMENT & PLAN NOTE
Pioglitazone  Metformin - Monitor A1c and FBS  Dietitian to evaluate   BS bid   No evidence hypoglycemia

## 2024-07-29 ENCOUNTER — NURSING HOME VISIT (OUTPATIENT)
Dept: POST ACUTE CARE | Facility: EXTERNAL LOCATION | Age: 69
End: 2024-07-29
Payer: MEDICARE

## 2024-07-29 DIAGNOSIS — F32.A DEPRESSION, UNSPECIFIED DEPRESSION TYPE: ICD-10-CM

## 2024-07-29 DIAGNOSIS — E11.69 TYPE 2 DIABETES MELLITUS WITH OTHER SPECIFIED COMPLICATION, UNSPECIFIED WHETHER LONG TERM INSULIN USE (MULTI): ICD-10-CM

## 2024-07-29 DIAGNOSIS — S82.091A CLOSED PATELLAR SLEEVE FRACTURE OF RIGHT KNEE, INITIAL ENCOUNTER: ICD-10-CM

## 2024-07-29 DIAGNOSIS — G89.29 OTHER CHRONIC PAIN: ICD-10-CM

## 2024-07-29 DIAGNOSIS — F31.9 BIPOLAR AFFECTIVE DISORDER, REMISSION STATUS UNSPECIFIED (MULTI): Primary | ICD-10-CM

## 2024-07-29 PROCEDURE — 99309 SBSQ NF CARE MODERATE MDM 30: CPT | Performed by: NURSE PRACTITIONER

## 2024-07-29 NOTE — LETTER
Patient: Allison Ryan  : 1955    Encounter Date: 2024    PROGRESS NOTE    Subjective  Chief complaint: Allison Ryan is a 69 y.o. female who is a long term care patient being seen and evaluated for recent Right patellar fracture     HPI: is wearing immobilizer on her right knee. Xray indicated right patellar fracture, nondisplaced. She reports that she tripped and landed on her knee.    She reports that her knee is comfortable - as long as no one touches her knee.   She reports that the tramadol helps.   Referral to be made for her to be seen by orthopedic.    HPI      Objective  Vital signs: 123/65-72-18-98.4     Physical Exam  Vitals and nursing note reviewed.   Constitutional:       General: She is not in acute distress.  Eyes:      Extraocular Movements: Extraocular movements intact.   Cardiovascular:      Rate and Rhythm: Normal rate and regular rhythm.   Pulmonary:      Effort: Pulmonary effort is normal.      Breath sounds: Normal breath sounds.      Comments: Lungs clear   Abdominal:      General: Bowel sounds are normal.      Palpations: Abdomen is soft.   Musculoskeletal:      Cervical back: Neck supple.      Right lower leg: No edema.      Left lower leg: No edema.      Comments: Wearing immobilizer right knee. Slight swelling evident    Neurological:      Mental Status: She is alert.   Psychiatric:         Mood and Affect: Mood normal.         Behavior: Behavior is cooperative.      Comments: Poor judgement         Assessment/Plan  Problem List Items Addressed This Visit       Chronic pain     Biofreeze has been more effective - arthritis   Tramadol prn q 6 htrs - for acute right knee fracture          Depression     Monitor mood and behaviors.  Trazodone  Meets regularly with mental ani team   And is participating in activity programs on unit   Social skills have improved          Type 2 diabetes mellitus (Multi)     Pioglitazone  Metformin - Monitor A1c and FBS  Dietitian to evaluate   BS  bid   No evidence hypoglycemia             Bipolar disorder (Multi) - Primary     Haloperidol monthly  Monitor mood and behaviors  Mental health follows   Clinical psychologist - follows and reports that she is making better choices          Closed patellar sleeve fracture of right knee, initial encounter     Xray - confirmed nondisplaced right patellar fracture   PT- immobilizer  WBAT   Elevate   Ice prn   Tramadol prn   Consult sent to orthopedic to assess           Medications, treatments, and labs reviewed  Continue medications and treatments as listed in EMR    RUSS Pizarro      Electronically Signed By: RUSS Pizarro   8/1/24 12:48 PM

## 2024-07-29 NOTE — PROGRESS NOTES
PROGRESS NOTE    Subjective   Chief complaint: Allison Ryan is a 69 y.o. female who is a long term care patient being seen and evaluated for recent Right patellar fracture     HPI: is wearing immobilizer on her right knee. Xray indicated right patellar fracture, nondisplaced. She reports that she tripped and landed on her knee.    She reports that her knee is comfortable - as long as no one touches her knee.   She reports that the tramadol helps.   Referral to be made for her to be seen by orthopedic.    HPI      Objective   Vital signs: 123/65-72-18-98.4     Physical Exam  Vitals and nursing note reviewed.   Constitutional:       General: She is not in acute distress.  Eyes:      Extraocular Movements: Extraocular movements intact.   Cardiovascular:      Rate and Rhythm: Normal rate and regular rhythm.   Pulmonary:      Effort: Pulmonary effort is normal.      Breath sounds: Normal breath sounds.      Comments: Lungs clear   Abdominal:      General: Bowel sounds are normal.      Palpations: Abdomen is soft.   Musculoskeletal:      Cervical back: Neck supple.      Right lower leg: No edema.      Left lower leg: No edema.      Comments: Wearing immobilizer right knee. Slight swelling evident    Neurological:      Mental Status: She is alert.   Psychiatric:         Mood and Affect: Mood normal.         Behavior: Behavior is cooperative.      Comments: Poor judgement         Assessment/Plan   Problem List Items Addressed This Visit       Chronic pain     Biofreeze has been more effective - arthritis   Tramadol prn q 6 htrs - for acute right knee fracture          Depression     Monitor mood and behaviors.  Trazodone  Meets regularly with mental ani team   And is participating in activity programs on unit   Social skills have improved          Type 2 diabetes mellitus (Multi)     Pioglitazone  Metformin - Monitor A1c and FBS  Dietitian to evaluate   BS bid   No evidence hypoglycemia             Bipolar disorder (Multi)  - Primary     Haloperidol monthly  Monitor mood and behaviors  Mental health follows   Clinical psychologist - follows and reports that she is making better choices          Closed patellar sleeve fracture of right knee, initial encounter     Xray - confirmed nondisplaced right patellar fracture   PT- immobilizer  WBAT   Elevate   Ice prn   Tramadol prn   Consult sent to orthopedic to assess           Medications, treatments, and labs reviewed  Continue medications and treatments as listed in EMR    Sylvie Ann, APRN-CNP

## 2024-07-31 DIAGNOSIS — S82.009A CLOSED DISPLACED FRACTURE OF PATELLA, UNSPECIFIED FRACTURE MORPHOLOGY, UNSPECIFIED LATERALITY, INITIAL ENCOUNTER: ICD-10-CM

## 2024-08-01 PROBLEM — F41.9 ANXIETY: Status: RESOLVED | Noted: 2024-03-05 | Resolved: 2024-08-01

## 2024-08-01 PROBLEM — S82.091A: Status: ACTIVE | Noted: 2024-08-01

## 2024-08-01 NOTE — ASSESSMENT & PLAN NOTE
Biofreeze has been more effective - arthritis   Tramadol prn q 6 htrs - for acute right knee fracture

## 2024-08-01 NOTE — ASSESSMENT & PLAN NOTE
Xray - confirmed nondisplaced right patellar fracture   PT- immobilizer  WBAT   Elevate   Ice prn   Tramadol prn   Consult sent to orthopedic to assess

## 2024-08-05 ENCOUNTER — NURSING HOME VISIT (OUTPATIENT)
Dept: POST ACUTE CARE | Facility: EXTERNAL LOCATION | Age: 69
End: 2024-08-05
Payer: MEDICARE

## 2024-08-05 DIAGNOSIS — S82.091A CLOSED PATELLAR SLEEVE FRACTURE OF RIGHT KNEE, INITIAL ENCOUNTER: Primary | ICD-10-CM

## 2024-08-05 DIAGNOSIS — F31.9 BIPOLAR AFFECTIVE DISORDER, REMISSION STATUS UNSPECIFIED (MULTI): ICD-10-CM

## 2024-08-05 DIAGNOSIS — F32.A DEPRESSION, UNSPECIFIED DEPRESSION TYPE: ICD-10-CM

## 2024-08-05 DIAGNOSIS — G30.0 EARLY ONSET ALZHEIMER'S DEMENTIA WITH MOOD DISTURBANCE, UNSPECIFIED DEMENTIA SEVERITY (MULTI): ICD-10-CM

## 2024-08-05 DIAGNOSIS — F02.83 EARLY ONSET ALZHEIMER'S DEMENTIA WITH MOOD DISTURBANCE, UNSPECIFIED DEMENTIA SEVERITY (MULTI): ICD-10-CM

## 2024-08-05 PROCEDURE — 99309 SBSQ NF CARE MODERATE MDM 30: CPT | Performed by: NURSE PRACTITIONER

## 2024-08-05 NOTE — LETTER
Patient: Allison Ryan  : 1955    Encounter Date: 2024    PROGRESS NOTE    Subjective  Chief complaint: Allison Ryan is a 69 y.o. female who is a long term care patient being seen and evaluated for healing knee fracture     HPI: Is in bed wearing immobilizer on her knee. She reports that the tramadol has been helpful. Denies any pain as along as - she doesn't touch her knee.   Pleasant and talkative. No anxiety. Is scheduled for orthopedic evaluation next week.   Reports that she has a good appetite   HPI      Objective  Vital signs: 126/65-72-18-98.4     Physical Exam  Vitals and nursing note reviewed.   Constitutional:       General: She is not in acute distress.  Eyes:      Extraocular Movements: Extraocular movements intact.   Cardiovascular:      Rate and Rhythm: Normal rate and regular rhythm.   Pulmonary:      Effort: Pulmonary effort is normal.      Breath sounds: Normal breath sounds.      Comments: Lungs clear   Abdominal:      General: Bowel sounds are normal.      Palpations: Abdomen is soft.   Musculoskeletal:      Cervical back: Neck supple.      Right lower leg: No edema.      Left lower leg: No edema.      Comments: Wearing immobilizer right knee. Slight swelling evident    Neurological:      Mental Status: She is alert.   Psychiatric:         Mood and Affect: Mood normal.         Behavior: Behavior is cooperative.      Comments: Poor judgement         Assessment/Plan  Problem List Items Addressed This Visit       Dementia (Multi)     Has cognitive loss with HX bipolar disease  Requires a secure structured environment for her safety   Poor historian   Has good social skills    Currently requires haldol decanoate IM monthly   Monitor behaviors   Monitor side effects.   Completed statement - not competent for own financial responsibilities          Depression     Monitor mood and behaviors.  Trazodone  Meets regularly with mental ani team   And is participating in activity programs on unit    Social skills have improved          Bipolar disorder (Multi)     Haloperidol monthly  Monitor mood and behaviors  Mental health follows   Clinical psychologist - follows and reports that she is making better choices          Closed patellar sleeve fracture of right knee, initial encounter - Primary     Xray - confirmed nondisplaced right patellar fracture   PT- immobilizer  WBAT   Elevate   Ice prn   Tramadol prn   Consult sent to orthopedic to assess           Medications, treatments, and labs reviewed  Continue medications and treatments as listed in EMR    RUSS Pizarro      Electronically Signed By: RUSS Pizarro   8/8/24  7:37 PM

## 2024-08-06 NOTE — PROGRESS NOTES
PROGRESS NOTE    Subjective   Chief complaint: Allison Ryan is a 69 y.o. female who is a long term care patient being seen and evaluated for healing knee fracture     HPI: Is in bed wearing immobilizer on her knee. She reports that the tramadol has been helpful. Denies any pain as along as - she doesn't touch her knee.   Pleasant and talkative. No anxiety. Is scheduled for orthopedic evaluation next week.   Reports that she has a good appetite   HPI      Objective   Vital signs: 126/65-72-18-98.4     Physical Exam  Vitals and nursing note reviewed.   Constitutional:       General: She is not in acute distress.  Eyes:      Extraocular Movements: Extraocular movements intact.   Cardiovascular:      Rate and Rhythm: Normal rate and regular rhythm.   Pulmonary:      Effort: Pulmonary effort is normal.      Breath sounds: Normal breath sounds.      Comments: Lungs clear   Abdominal:      General: Bowel sounds are normal.      Palpations: Abdomen is soft.   Musculoskeletal:      Cervical back: Neck supple.      Right lower leg: No edema.      Left lower leg: No edema.      Comments: Wearing immobilizer right knee. Slight swelling evident    Neurological:      Mental Status: She is alert.   Psychiatric:         Mood and Affect: Mood normal.         Behavior: Behavior is cooperative.      Comments: Poor judgement         Assessment/Plan   Problem List Items Addressed This Visit       Dementia (Multi)     Has cognitive loss with HX bipolar disease  Requires a secure structured environment for her safety   Poor historian   Has good social skills    Currently requires haldol decanoate IM monthly   Monitor behaviors   Monitor side effects.   Completed statement - not competent for own financial responsibilities          Depression     Monitor mood and behaviors.  Trazodone  Meets regularly with mental ani team   And is participating in activity programs on unit   Social skills have improved          Bipolar disorder (Multi)      Haloperidol monthly  Monitor mood and behaviors  Mental health follows   Clinical psychologist - follows and reports that she is making better choices          Closed patellar sleeve fracture of right knee, initial encounter - Primary     Xray - confirmed nondisplaced right patellar fracture   PT- immobilizer  WBAT   Elevate   Ice prn   Tramadol prn   Consult sent to orthopedic to assess           Medications, treatments, and labs reviewed  Continue medications and treatments as listed in EMR    Sylvie Ann, APRN-CNP

## 2024-08-12 ENCOUNTER — NURSING HOME VISIT (OUTPATIENT)
Dept: POST ACUTE CARE | Facility: EXTERNAL LOCATION | Age: 69
End: 2024-08-12
Payer: MEDICARE

## 2024-08-12 DIAGNOSIS — F31.9 BIPOLAR AFFECTIVE DISORDER, REMISSION STATUS UNSPECIFIED (MULTI): ICD-10-CM

## 2024-08-12 DIAGNOSIS — E11.69 TYPE 2 DIABETES MELLITUS WITH OTHER SPECIFIED COMPLICATION, UNSPECIFIED WHETHER LONG TERM INSULIN USE (MULTI): Primary | ICD-10-CM

## 2024-08-12 DIAGNOSIS — G30.0 EARLY ONSET ALZHEIMER'S DEMENTIA WITH MOOD DISTURBANCE, UNSPECIFIED DEMENTIA SEVERITY (MULTI): ICD-10-CM

## 2024-08-12 DIAGNOSIS — F02.83 EARLY ONSET ALZHEIMER'S DEMENTIA WITH MOOD DISTURBANCE, UNSPECIFIED DEMENTIA SEVERITY (MULTI): ICD-10-CM

## 2024-08-12 DIAGNOSIS — S82.091A CLOSED PATELLAR SLEEVE FRACTURE OF RIGHT KNEE, INITIAL ENCOUNTER: ICD-10-CM

## 2024-08-12 DIAGNOSIS — F32.A DEPRESSION, UNSPECIFIED DEPRESSION TYPE: ICD-10-CM

## 2024-08-12 PROCEDURE — 99309 SBSQ NF CARE MODERATE MDM 30: CPT | Performed by: NURSE PRACTITIONER

## 2024-08-12 NOTE — LETTER
Patient: Allison Ryan  : 1955    Encounter Date: 2024    PROGRESS NOTE    Subjective  Chief complaint: Allison Ryan is a 69 y.o. female who is a long term care patient being seen and evaluated for healing knee fracture.     HPI: Was seen by outpatient - orthopedic consult this morning. She reports that she is to continue with immobilizer and [facility]   Currently reports that her knee is comfortable and the tramadol continues to help with her pain.    Her foot and leg are warm with good coloration. She continues to keep her leg elevated in bed.   HPI      Objective  Vital signs: 128/76-82-18-98.3     Physical Exam  Vitals and nursing note reviewed.   Constitutional:       General: She is not in acute distress.  Eyes:      Extraocular Movements: Extraocular movements intact.   Cardiovascular:      Rate and Rhythm: Normal rate and regular rhythm.   Pulmonary:      Effort: Pulmonary effort is normal.      Breath sounds: Normal breath sounds.      Comments: Lungs clear   Abdominal:      General: Bowel sounds are normal.      Palpations: Abdomen is soft.   Musculoskeletal:      Cervical back: Neck supple.      Right lower leg: No edema.      Left lower leg: No edema.      Comments: Wearing immobilizer right knee. Slight swelling evident    Neurological:      Mental Status: She is alert.   Psychiatric:         Mood and Affect: Mood normal.         Behavior: Behavior is cooperative.      Comments: Poor judgement         Assessment/Plan  Problem List Items Addressed This Visit       Dementia (Multi)     Has cognitive loss with HX bipolar disease  Requires a secure structured environment for her safety   Poor historian   Has good social skills    Currently requires haldol decanoate IM monthly   Monitor behaviors   Monitor side effects.   Is involved in her own care, encourage all participation  Completed statement - not competent for own financial responsibilities           Depression     Monitor mood and behaviors.  Trazodone  Meets regularly with mental ani team   And is participating in activity programs on unit   Social skills have improved          Type 2 diabetes mellitus (Multi) - Primary     Pioglitazone  Metformin - Monitor A1c and FBS  Dietitian to evaluate   BS bid   No evidence hypoglycemia             Bipolar disorder (Multi)     Haloperidol monthly  Monitor mood and behaviors  Mental health follows   Clinical psychologist - follows and reports that she is making better choices   Is engaged with her care         Closed patellar sleeve fracture of right knee, initial encounter     Xray - confirmed nondisplaced right patellar fracture   PT- immobilizer  WBAT   Elevate   Ice prn   Tramadol prn   Consult sent to orthopedic to assess - has follow up appointment in Sept 2024           Medications, treatments, and labs reviewed  Continue medications and treatments as listed in EMR    RUSS Pizarro      Electronically Signed By: RUSS Pizarro   8/15/24  7:19 PM

## 2024-08-14 NOTE — PROGRESS NOTES
PROGRESS NOTE    Subjective   Chief complaint: Allison Ryan is a 69 y.o. female who is a long term care patient being seen and evaluated for healing knee fracture.     HPI: Was seen by outpatient - orthopedic consult this morning. She reports that she is to continue with immobilizer and she has a follow up appointment  in septemeber.   Currently reports that her knee is comfortable and the tramadol continues to help with her pain.    Her foot and leg are warm with good coloration. She continues to keep her leg elevated in bed.   HPI      Objective   Vital signs: 128/76-82-18-98.3     Physical Exam  Vitals and nursing note reviewed.   Constitutional:       General: She is not in acute distress.  Eyes:      Extraocular Movements: Extraocular movements intact.   Cardiovascular:      Rate and Rhythm: Normal rate and regular rhythm.   Pulmonary:      Effort: Pulmonary effort is normal.      Breath sounds: Normal breath sounds.      Comments: Lungs clear   Abdominal:      General: Bowel sounds are normal.      Palpations: Abdomen is soft.   Musculoskeletal:      Cervical back: Neck supple.      Right lower leg: No edema.      Left lower leg: No edema.      Comments: Wearing immobilizer right knee. Slight swelling evident    Neurological:      Mental Status: She is alert.   Psychiatric:         Mood and Affect: Mood normal.         Behavior: Behavior is cooperative.      Comments: Poor judgement         Assessment/Plan   Problem List Items Addressed This Visit       Dementia (Multi)     Has cognitive loss with HX bipolar disease  Requires a secure structured environment for her safety   Poor historian   Has good social skills    Currently requires haldol decanoate IM monthly   Monitor behaviors   Monitor side effects.   Is involved in her own care, encourage all participation  Completed statement - not competent for own financial responsibilities          Depression     Monitor mood and behaviors.  Trazodone  Meets regularly  with mental ani team   And is participating in activity programs on unit   Social skills have improved          Type 2 diabetes mellitus (Multi) - Primary     Pioglitazone  Metformin - Monitor A1c and FBS  Dietitian to evaluate   BS bid   No evidence hypoglycemia             Bipolar disorder (Multi)     Haloperidol monthly  Monitor mood and behaviors  Mental health follows   Clinical psychologist - follows and reports that she is making better choices   Is engaged with her care         Closed patellar sleeve fracture of right knee, initial encounter     Xray - confirmed nondisplaced right patellar fracture   PT- immobilizer  WBAT   Elevate   Ice prn   Tramadol prn   Consult sent to orthopedic to assess - has follow up appointment in Sept 2024           Medications, treatments, and labs reviewed  Continue medications and treatments as listed in EMR    Sylvie Ann, APRN-CNP

## 2024-08-15 NOTE — ASSESSMENT & PLAN NOTE
Xray - confirmed nondisplaced right patellar fracture   PT- immobilizer  WBAT   Elevate   Ice prn   Tramadol prn   Consult sent to orthopedic to assess - has follow up appointment in Sept 2024

## 2024-08-15 NOTE — ASSESSMENT & PLAN NOTE
Has cognitive loss with HX bipolar disease  Requires a secure structured environment for her safety   Poor historian   Has good social skills    Currently requires haldol decanoate IM monthly   Monitor behaviors   Monitor side effects.   Is involved in her own care, encourage all participation  Completed statement - not competent for own financial responsibilities

## 2024-08-15 NOTE — ASSESSMENT & PLAN NOTE
Haloperidol monthly  Monitor mood and behaviors  Mental health follows   Clinical psychologist - follows and reports that she is making better choices   Is engaged with her care

## 2024-08-19 ENCOUNTER — NURSING HOME VISIT (OUTPATIENT)
Dept: POST ACUTE CARE | Facility: EXTERNAL LOCATION | Age: 69
End: 2024-08-19
Payer: MEDICARE

## 2024-08-19 DIAGNOSIS — S82.091A CLOSED PATELLAR SLEEVE FRACTURE OF RIGHT KNEE, INITIAL ENCOUNTER: ICD-10-CM

## 2024-08-19 DIAGNOSIS — E11.69 TYPE 2 DIABETES MELLITUS WITH OTHER SPECIFIED COMPLICATION, UNSPECIFIED WHETHER LONG TERM INSULIN USE (MULTI): Primary | ICD-10-CM

## 2024-08-19 DIAGNOSIS — F31.9 BIPOLAR AFFECTIVE DISORDER, REMISSION STATUS UNSPECIFIED (MULTI): ICD-10-CM

## 2024-08-19 DIAGNOSIS — R10.9 ABDOMINAL CRAMPS: ICD-10-CM

## 2024-08-19 PROCEDURE — 99309 SBSQ NF CARE MODERATE MDM 30: CPT | Performed by: NURSE PRACTITIONER

## 2024-08-19 NOTE — ASSESSMENT & PLAN NOTE
Reports intestinal cramping   Reports that bentyl has help in the past   Denies any constipation   Will start bentyl prn , monitor

## 2024-08-19 NOTE — LETTER
Patient: Allison Ryan  : 1955    Encounter Date: 2024    PROGRESS NOTE    Subjective  Chief complaint: Allison Ryan is a 69 y.o. female who is a long term care patient being seen and evaluated for healing patella fracture     HPI: is currently in bed. Wearing immobilizer. She reports that tylenol helps.    Is requesting bentyl - reports occasional intestinal spasms.   She reports that bentyl has helped in the past.   Reports that she has a good appetite. Denies any constipation.     HPI      Objective  Vital signs: 116/65-72-18-98.4     Physical Exam  Vitals and nursing note reviewed.   Constitutional:       General: She is not in acute distress.  Eyes:      Extraocular Movements: Extraocular movements intact.   Cardiovascular:      Rate and Rhythm: Normal rate and regular rhythm.   Pulmonary:      Effort: Pulmonary effort is normal.      Breath sounds: Normal breath sounds.      Comments: Lungs clear   Abdominal:      General: Bowel sounds are normal.      Palpations: Abdomen is soft.   Musculoskeletal:      Cervical back: Neck supple.      Right lower leg: No edema.      Left lower leg: No edema.      Comments: Wearing immobilizer right knee.   Leg is warm with good coloration   No swelling evident    Neurological:      Mental Status: She is alert.   Psychiatric:         Mood and Affect: Mood normal.         Behavior: Behavior is cooperative.      Comments: Poor judgement         Assessment/Plan  Problem List Items Addressed This Visit       Type 2 diabetes mellitus (Multi) - Primary     Pioglitazone  Metformin - Monitor A1c and FBS  Dietitian to evaluate   BS bid   No evidence hypoglycemia             Bipolar disorder (Multi)     Haloperidol monthly  Monitor mood and behaviors  Mental health follows   Clinical psychologist - follows and reports that she is making better choices   Is engaged with her care  Is cooping well with her knee fracture          Closed patellar sleeve fracture of right knee,  initial encounter     Xray - confirmed nondisplaced right patellar fracture   PT- immobilizer  WBAT   Elevate   Ice prn   Tramadol prn   Consult sent to orthopedic to assess - has follow up appointment in Sept 2024          Abdominal cramps     Reports intestinal cramping   Reports that bentyl has help in the past   Denies any constipation   Will start bentyl prn , monitor           Medications, treatments, and labs reviewed  Continue medications and treatments as listed in EMR    RUSS Pizarro      Electronically Signed By: RUSS Pizarro   8/22/24  9:20 PM

## 2024-08-19 NOTE — ASSESSMENT & PLAN NOTE
Haloperidol monthly  Monitor mood and behaviors  Mental health follows   Clinical psychologist - follows and reports that she is making better choices   Is engaged with her care  Is cooping well with her knee fracture

## 2024-08-19 NOTE — PROGRESS NOTES
PROGRESS NOTE    Subjective   Chief complaint: Allison Ryan is a 69 y.o. female who is a long term care patient being seen and evaluated for healing patella fracture     HPI: is currently in bed. Wearing immobilizer. She reports that tylenol helps.    Is requesting bentyl - reports occasional intestinal spasms.   She reports that bentyl has helped in the past.   Reports that she has a good appetite. Denies any constipation.     HPI      Objective   Vital signs: 116/65-72-18-98.4     Physical Exam  Vitals and nursing note reviewed.   Constitutional:       General: She is not in acute distress.  Eyes:      Extraocular Movements: Extraocular movements intact.   Cardiovascular:      Rate and Rhythm: Normal rate and regular rhythm.   Pulmonary:      Effort: Pulmonary effort is normal.      Breath sounds: Normal breath sounds.      Comments: Lungs clear   Abdominal:      General: Bowel sounds are normal.      Palpations: Abdomen is soft.   Musculoskeletal:      Cervical back: Neck supple.      Right lower leg: No edema.      Left lower leg: No edema.      Comments: Wearing immobilizer right knee.   Leg is warm with good coloration   No swelling evident    Neurological:      Mental Status: She is alert.   Psychiatric:         Mood and Affect: Mood normal.         Behavior: Behavior is cooperative.      Comments: Poor judgement         Assessment/Plan   Problem List Items Addressed This Visit       Type 2 diabetes mellitus (Multi) - Primary     Pioglitazone  Metformin - Monitor A1c and FBS  Dietitian to evaluate   BS bid   No evidence hypoglycemia             Bipolar disorder (Multi)     Haloperidol monthly  Monitor mood and behaviors  Mental health follows   Clinical psychologist - follows and reports that she is making better choices   Is engaged with her care  Is cooping well with her knee fracture          Closed patellar sleeve fracture of right knee, initial encounter     Xray - confirmed nondisplaced right patellar  fracture   PT- immobilizer  WBAT   Elevate   Ice prn   Tramadol prn   Consult sent to orthopedic to assess - has follow up appointment in Sept 2024          Abdominal cramps     Reports intestinal cramping   Reports that bentyl has help in the past   Denies any constipation   Will start bentyl prn , monitor           Medications, treatments, and labs reviewed  Continue medications and treatments as listed in EMR    Sylvie Ann, APRN-CNP